# Patient Record
Sex: MALE | Race: OTHER | NOT HISPANIC OR LATINO
[De-identification: names, ages, dates, MRNs, and addresses within clinical notes are randomized per-mention and may not be internally consistent; named-entity substitution may affect disease eponyms.]

---

## 2022-01-01 ENCOUNTER — NON-APPOINTMENT (OUTPATIENT)
Age: 37
End: 2022-01-01

## 2022-01-01 ENCOUNTER — OUTPATIENT (OUTPATIENT)
Dept: OUTPATIENT SERVICES | Facility: HOSPITAL | Age: 37
LOS: 1 days | Discharge: HOME | End: 2022-01-01

## 2022-01-01 ENCOUNTER — APPOINTMENT (OUTPATIENT)
Dept: INFUSION THERAPY | Facility: HOSPITAL | Age: 37
End: 2022-01-01

## 2022-01-01 ENCOUNTER — RESULT REVIEW (OUTPATIENT)
Age: 37
End: 2022-01-01

## 2022-01-01 ENCOUNTER — APPOINTMENT (OUTPATIENT)
Dept: CT IMAGING | Facility: IMAGING CENTER | Age: 37
End: 2022-01-01

## 2022-01-01 ENCOUNTER — APPOINTMENT (OUTPATIENT)
Dept: HEMATOLOGY ONCOLOGY | Facility: CLINIC | Age: 37
End: 2022-01-01

## 2022-01-01 ENCOUNTER — OUTPATIENT (OUTPATIENT)
Dept: OUTPATIENT SERVICES | Facility: HOSPITAL | Age: 37
LOS: 1 days | End: 2022-01-01
Payer: COMMERCIAL

## 2022-01-01 ENCOUNTER — TRANSCRIPTION ENCOUNTER (OUTPATIENT)
Age: 37
End: 2022-01-01

## 2022-01-01 VITALS
RESPIRATION RATE: 16 BRPM | SYSTOLIC BLOOD PRESSURE: 153 MMHG | BODY MASS INDEX: 27.78 KG/M2 | OXYGEN SATURATION: 98 % | HEIGHT: 74.8 IN | TEMPERATURE: 98.6 F | WEIGHT: 221.12 LBS | DIASTOLIC BLOOD PRESSURE: 80 MMHG | HEART RATE: 78 BPM

## 2022-01-01 VITALS
BODY MASS INDEX: 28.09 KG/M2 | HEIGHT: 74.8 IN | RESPIRATION RATE: 16 BRPM | TEMPERATURE: 98.4 F | WEIGHT: 223.55 LBS | HEART RATE: 88 BPM | OXYGEN SATURATION: 99 % | SYSTOLIC BLOOD PRESSURE: 129 MMHG | DIASTOLIC BLOOD PRESSURE: 86 MMHG

## 2022-01-01 DIAGNOSIS — D64.9 ANEMIA, UNSPECIFIED: ICD-10-CM

## 2022-01-01 DIAGNOSIS — C18.9 MALIGNANT NEOPLASM OF COLON, UNSPECIFIED: ICD-10-CM

## 2022-01-01 LAB
ALBUMIN SERPL ELPH-MCNC: 4.5 G/DL
ALBUMIN SERPL ELPH-MCNC: 4.7 G/DL
ALP BLD-CCNC: 92 U/L
ALP BLD-CCNC: 93 U/L
ALT SERPL-CCNC: 15 U/L
ALT SERPL-CCNC: 26 U/L
ANION GAP SERPL CALC-SCNC: 12 MMOL/L
ANION GAP SERPL CALC-SCNC: 14 MMOL/L
AST SERPL-CCNC: 22 U/L
AST SERPL-CCNC: 28 U/L
BASOPHILS # BLD AUTO: 0.09 K/UL — SIGNIFICANT CHANGE UP (ref 0–0.2)
BASOPHILS # BLD AUTO: 0.09 K/UL — SIGNIFICANT CHANGE UP (ref 0–0.2)
BASOPHILS NFR BLD AUTO: 1 % — SIGNIFICANT CHANGE UP (ref 0–2)
BASOPHILS NFR BLD AUTO: 1 % — SIGNIFICANT CHANGE UP (ref 0–2)
BILIRUB SERPL-MCNC: 0.3 MG/DL
BILIRUB SERPL-MCNC: 0.3 MG/DL
BUN SERPL-MCNC: 9 MG/DL
BUN SERPL-MCNC: 9 MG/DL
CALCIUM SERPL-MCNC: 10.4 MG/DL
CALCIUM SERPL-MCNC: 9.4 MG/DL
CEA SERPL-MCNC: 36.6 NG/ML
CHLORIDE SERPL-SCNC: 104 MMOL/L
CHLORIDE SERPL-SCNC: 104 MMOL/L
CO2 SERPL-SCNC: 22 MMOL/L
CO2 SERPL-SCNC: 26 MMOL/L
CREAT SERPL-MCNC: 0.85 MG/DL
CREAT SERPL-MCNC: 0.88 MG/DL
EGFR: 114 ML/MIN/1.73M2
EGFR: 115 ML/MIN/1.73M2
EOSINOPHIL # BLD AUTO: 0.66 K/UL — HIGH (ref 0–0.5)
EOSINOPHIL # BLD AUTO: 0.69 K/UL — HIGH (ref 0–0.5)
EOSINOPHIL NFR BLD AUTO: 7 % — HIGH (ref 0–6)
EOSINOPHIL NFR BLD AUTO: 7.8 % — HIGH (ref 0–6)
GLUCOSE SERPL-MCNC: 82 MG/DL
GLUCOSE SERPL-MCNC: 95 MG/DL
HCT VFR BLD CALC: 42.1 % — SIGNIFICANT CHANGE UP (ref 39–50)
HCT VFR BLD CALC: 44.8 % — SIGNIFICANT CHANGE UP (ref 39–50)
HGB BLD-MCNC: 12.6 G/DL — LOW (ref 13–17)
HGB BLD-MCNC: 13.4 G/DL — SIGNIFICANT CHANGE UP (ref 13–17)
IMM GRANULOCYTES NFR BLD AUTO: 0.1 % — SIGNIFICANT CHANGE UP (ref 0–0.9)
IMM GRANULOCYTES NFR BLD AUTO: 0.2 % — SIGNIFICANT CHANGE UP (ref 0–1.5)
LYMPHOCYTES # BLD AUTO: 2.6 K/UL — SIGNIFICANT CHANGE UP (ref 1–3.3)
LYMPHOCYTES # BLD AUTO: 2.91 K/UL — SIGNIFICANT CHANGE UP (ref 1–3.3)
LYMPHOCYTES # BLD AUTO: 29.2 % — SIGNIFICANT CHANGE UP (ref 13–44)
LYMPHOCYTES # BLD AUTO: 30.7 % — SIGNIFICANT CHANGE UP (ref 13–44)
MCHC RBC-ENTMCNC: 21.4 PG — LOW (ref 27–34)
MCHC RBC-ENTMCNC: 21.7 PG — LOW (ref 27–34)
MCHC RBC-ENTMCNC: 29.9 G/DL — LOW (ref 32–36)
MCHC RBC-ENTMCNC: 29.9 G/DL — LOW (ref 32–36)
MCV RBC AUTO: 71.6 FL — LOW (ref 80–100)
MCV RBC AUTO: 72.6 FL — LOW (ref 80–100)
MONOCYTES # BLD AUTO: 0.62 K/UL — SIGNIFICANT CHANGE UP (ref 0–0.9)
MONOCYTES # BLD AUTO: 0.65 K/UL — SIGNIFICANT CHANGE UP (ref 0–0.9)
MONOCYTES NFR BLD AUTO: 6.5 % — SIGNIFICANT CHANGE UP (ref 2–14)
MONOCYTES NFR BLD AUTO: 7.3 % — SIGNIFICANT CHANGE UP (ref 2–14)
NEUTROPHILS # BLD AUTO: 4.86 K/UL — SIGNIFICANT CHANGE UP (ref 1.8–7.4)
NEUTROPHILS # BLD AUTO: 5.17 K/UL — SIGNIFICANT CHANGE UP (ref 1.8–7.4)
NEUTROPHILS NFR BLD AUTO: 54.6 % — SIGNIFICANT CHANGE UP (ref 43–77)
NEUTROPHILS NFR BLD AUTO: 54.6 % — SIGNIFICANT CHANGE UP (ref 43–77)
NRBC # BLD: 0 /100 WBCS — SIGNIFICANT CHANGE UP (ref 0–0)
NRBC # BLD: 0 /100 WBCS — SIGNIFICANT CHANGE UP (ref 0–0)
PLATELET # BLD AUTO: 297 K/UL — SIGNIFICANT CHANGE UP (ref 150–400)
PLATELET # BLD AUTO: 300 K/UL — SIGNIFICANT CHANGE UP (ref 150–400)
POTASSIUM SERPL-SCNC: 4 MMOL/L
POTASSIUM SERPL-SCNC: 4.8 MMOL/L
PROT SERPL-MCNC: 7.6 G/DL
PROT SERPL-MCNC: 7.9 G/DL
RBC # BLD: 5.88 M/UL — HIGH (ref 4.2–5.8)
RBC # BLD: 6.17 M/UL — HIGH (ref 4.2–5.8)
RBC # FLD: 18.3 % — HIGH (ref 10.3–14.5)
RBC # FLD: 18.6 % — HIGH (ref 10.3–14.5)
SODIUM SERPL-SCNC: 141 MMOL/L
SODIUM SERPL-SCNC: 142 MMOL/L
WBC # BLD: 8.9 K/UL — SIGNIFICANT CHANGE UP (ref 3.8–10.5)
WBC # BLD: 9.47 K/UL — SIGNIFICANT CHANGE UP (ref 3.8–10.5)
WBC # FLD AUTO: 8.9 K/UL — SIGNIFICANT CHANGE UP (ref 3.8–10.5)
WBC # FLD AUTO: 9.47 K/UL — SIGNIFICANT CHANGE UP (ref 3.8–10.5)

## 2022-01-01 PROCEDURE — 74177 CT ABD & PELVIS W/CONTRAST: CPT

## 2022-01-01 PROCEDURE — 71260 CT THORAX DX C+: CPT

## 2022-01-01 PROCEDURE — 99215 OFFICE O/P EST HI 40 MIN: CPT

## 2022-01-01 PROCEDURE — 74177 CT ABD & PELVIS W/CONTRAST: CPT | Mod: 26

## 2022-01-01 PROCEDURE — 71260 CT THORAX DX C+: CPT | Mod: 26

## 2022-01-01 PROCEDURE — 99205 OFFICE O/P NEW HI 60 MIN: CPT

## 2022-01-01 RX ORDER — ONDANSETRON 8 MG/1
8 TABLET ORAL EVERY 8 HOURS
Qty: 30 | Refills: 0 | Status: ACTIVE | COMMUNITY
Start: 2022-01-01 | End: 1900-01-01

## 2022-08-31 PROBLEM — Z00.00 ENCOUNTER FOR PREVENTIVE HEALTH EXAMINATION: Status: ACTIVE | Noted: 2022-01-01

## 2022-09-08 NOTE — HISTORY OF PRESENT ILLNESS
[Disease: _____________________] : Disease: [unfilled] [de-identified] : 37 year old male from [facility], presents to [email or web address] to establish care for metastatic L. sided colon cancer (KRAS WT, BRAF WT, HER2-, SAMAN).\par \par Mr. Barrientos is a 38 y/o M with no PMH who initially presented to hospital in his home country of [facility] in January 2021.  At that time he had experienced left lower quadrant abdominal pain followed by bright red blood per rectum. He underwent a sigmoidoscopy which revealed a 25cm mass from the anal verge obstructing the lumen, path confirmed moderately differentiated adenocarcinoma of colon. He subsequently underwent LAR surgery, with primary end to end anstamosis and resection of 2 omental nodes, 18LN+. He then had 1st line FOLFOX +Bevacizumab for 8 cycles, complicated by catheter site thrombosis now on anticoagulation with Xarelto, and evidence of progression of disease. Then had 2nd line treatment with FOLFIRI + Cetuximab X 4 cycles and f/u CT scan showed interval morphological increase in size of peritoneal nodules with one having increased FDG uptake. He then underwent peritonectomy and HIPEC with residual peritoneal nodule noted to have mucinous adenoca and so treated with 4 cycles of CAPIRI + Cetuximab. Recent CT scan 7/16/2022 showed stable appearing periotoneal nodules with an interval increase of segment 5 hepatic lesion (15 mm from 7mm). Plan was for local treatment of liver lesion with resection vs RFA, however, patient did not have this done and on the recommendation of his oncologists came [place] the [place] for a clinical trial.\par \par PMH/PSH: as above\par Social Hx: has family in [place] (older brother and mother), otherwise wife and 2 young children (son Ravi 3, daughter Jacquie 5) live in [facility], works in  for oil company\par Family Hx: no known history\par Allergies: NKDA\par Medications: taking none at this time\par  [de-identified] : CEAn 7/2022= 20.2 [de-identified] : Mr. Barrientos is feeling well today, states he is completely asymptomatic. Denies fever/chills, nausea/vomiting, abdominal pain, diarrhea/constipation, melena/BRBPR. He is anxious to begin next treatment and misses his family.  [ECOG Performance Status: 0 - Fully active, able to carry on all pre-disease performance without restriction] : Performance Status: 0 - Fully active, able to carry on all pre-disease performance without restriction

## 2022-09-08 NOTE — ASSESSMENT
[FreeTextEntry1] : 37 year old male from [place], presents to [facility] to establish care for metastatic L. sided colon cancer (KRAS WT, BRAF WT, HER2-, SAMAN). s/p 1st line FOLFOX + Linda X 8 cycles, s/p 2nd line FOLOFIRI + Cetuximab X 4 cycles then POD s/p peritonectomy and HIPEC then CAPIRI + Cetuximab X 4 cycles. \par Recent restaging scans 7/2022 with stable appearing peritoneal nodules and increase in size of segment 5 hepatic lesion (15 mm). Recommended by oncologists in [facility] to follow up in [place] for clinical trials.\par \par - STAT CT Chest/Abd/Pelvis to evaluate disease status and compare to prior images\par - will likely need tissue biopsy, will assess after scans\par - will discuss his case at next GI tumor board on 9/14\par \par Return to clinic end of next week\par \par Patient had several questions and all were answered to satisfaction.

## 2022-09-15 NOTE — REASON FOR VISIT
[Follow-Up Visit] : a follow-up [Initial Consultation] : an initial consultation [FreeTextEntry2] : Metastatic Colon Ca

## 2022-09-15 NOTE — HISTORY OF PRESENT ILLNESS
[Disease: _____________________] : Disease: [unfilled] [ECOG Performance Status: 0 - Fully active, able to carry on all pre-disease performance without restriction] : Performance Status: 0 - Fully active, able to carry on all pre-disease performance without restriction [de-identified] : 37 year old male from [facility], presents to [facility] to establish care for metastatic L. sided colon cancer (KRAS WT, BRAF WT, HER2-, SAMAN).\par \par Mr. Barrientos is a 38 y/o M with no PMH who initially presented to hospital in his home country of [facility] in January 2021.  At that time he had experienced left lower quadrant abdominal pain followed by bright red blood per rectum. He underwent a sigmoidoscopy which revealed a 25cm mass from the anal verge obstructing the lumen, path confirmed moderately differentiated adenocarcinoma of colon. He subsequently underwent LAR surgery, with primary end to end anstamosis and resection of 2 omental nodes, 18LN+. He then had 1st line FOLFOX +Bevacizumab for 8 cycles, complicated by catheter site thrombosis now on anticoagulation with Xarelto, and evidence of progression of disease. Then had 2nd line treatment with FOLFIRI + Cetuximab X 4 cycles and f/u PET scan showed interval morphological increase in size of peritoneal nodules with one having increased FDG uptake. He then underwent peritonectomy and HIPEC with residual peritoneal nodule noted to have mucinous adenoca and so treated with 4 cycles of CAPIRI + Cetuximab. Recent CT scan 7/16/2022 showed stable appearing periotoneal nodules with an interval increase of segment 5 hepatic lesion (15 mm from 7mm). 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 Barrientos is feeling well today, states he is completely asymptomatic. Denies fever/chills, nausea/vomiting, abdominal pain, diarrhea/constipation, melena/BRBPR.\par \par His case was discussed at GI Tumor Board on 9/14/2022. Upon radiology review there is increase in size of liver segment from 2X 1.8cm to 3.4X 2.4cm. Additionally a second subcapsular 1cm right lobe lesion conerning for met and another developing one. Mesenteric masses also found including a left upper pelvis nodule 1.8 X1.7cm and a 1.5cm mesenteric nodule.  Given these findings, there are unfortunately no available clinical trials at [facility] that are recruiting. This was conveyed to the patient and although disappointed he is hopeful that next line of therapy will work.

## 2022-09-15 NOTE — ASSESSMENT
[FreeTextEntry1] : 37 year old male from [place], presents to [facility] to establish care for metastatic L. sided colon cancer (KRAS WT, BRAF WT, HER2-, SAMAN). s/p 1st line FOLFOX + Linda X 8 cycles, s/p 2nd line FOLOFIRI + Cetuximab X 4 cycles then POD s/p peritonectomy and HIPEC then CAPIRI + Cetuximab X 4 cycles. \par Recent restaging scans 7/2022 with stable appearing peritoneal nodules and increase in size of segment 5 hepatic lesion (15 mm). Recommended by oncologists in [place] for [place].\par \par \par - patient's case discussed at GI Tumor Board on 9/14/2022 and radiology images reviewed and compared to his prior ones from [place] that were done in July 2022. Upon radiology review there is increase in size of liver segment from 2X 1.8cm to 3.4X 2.4cm. Additionally a second subcapsular 1cm right lobe lesion conerning for met and another developing one. Mesenteric masses also found including a left upper pelvis nodule 1.8 X1.7cm and a 1.5cm mesenteric nodule.  Given these findings, there are unfortunately no available clinical trials at [facility] that are recruiting.  Additionally searched on clinicaltrials.gov and was not able to find any phase III studies that are accruing patients. Patient requested a referral to [facility] which has been placed.\par \par In regards to next line of therapy, patient has progressed on FOLFOX, FOLFIRI with no response to Cetuximab despite KRAS WT status (this was confirmed with his primary oncologist). Have sent liquid biopsy for molecular testing to determine any actionable mutations, but results can take several weeks.  In the meantime discussed subsequent line of treatment with Regorafenib or Lonsurf +/- Bevacizumab per standard of care treatment options via [facility]. Patient elected Regorafenib. Side effects including but not limited to hypertension, fatigue, diarrhea, nausea/vomiting were reviewed and consent signed.  Will begin with Regorafenib 80mg daily X 1 week and if tolerating will increase to full dose 160mg daily. Plan will be for Regorafenib daily X 21 days per every 28 day cycle.\par \par Return to clinic  next week with labs prior.  Have also ordered MRI liver to further elucidate lesions seen on CT scan. \par \par Patient had several questions and all were answered to satisfaction.  Also communicated plan with patient's primary oncologist Dr. Jeronimo and Dr. Daniel in [place].

## 2023-01-01 ENCOUNTER — INPATIENT (INPATIENT)
Facility: HOSPITAL | Age: 38
LOS: 3 days | Discharge: HOME | DRG: 375 | End: 2023-05-26
Attending: STUDENT IN AN ORGANIZED HEALTH CARE EDUCATION/TRAINING PROGRAM | Admitting: HOSPITALIST
Payer: MEDICAID

## 2023-01-01 ENCOUNTER — NON-APPOINTMENT (OUTPATIENT)
Age: 38
End: 2023-01-01

## 2023-01-01 ENCOUNTER — OUTPATIENT (OUTPATIENT)
Dept: OUTPATIENT SERVICES | Facility: HOSPITAL | Age: 38
LOS: 1 days | End: 2023-01-01
Payer: COMMERCIAL

## 2023-01-01 ENCOUNTER — APPOINTMENT (OUTPATIENT)
Dept: CT IMAGING | Facility: CLINIC | Age: 38
End: 2023-01-01

## 2023-01-01 ENCOUNTER — RESULT REVIEW (OUTPATIENT)
Age: 38
End: 2023-01-01

## 2023-01-01 ENCOUNTER — APPOINTMENT (OUTPATIENT)
Dept: RADIOLOGY | Facility: IMAGING CENTER | Age: 38
End: 2023-01-01

## 2023-01-01 ENCOUNTER — APPOINTMENT (OUTPATIENT)
Dept: MRI IMAGING | Facility: IMAGING CENTER | Age: 38
End: 2023-01-01
Payer: MEDICAID

## 2023-01-01 ENCOUNTER — APPOINTMENT (OUTPATIENT)
Dept: HEMATOLOGY ONCOLOGY | Facility: CLINIC | Age: 38
End: 2023-01-01

## 2023-01-01 ENCOUNTER — TRANSCRIPTION ENCOUNTER (OUTPATIENT)
Age: 38
End: 2023-01-01

## 2023-01-01 ENCOUNTER — APPOINTMENT (OUTPATIENT)
Dept: ULTRASOUND IMAGING | Facility: CLINIC | Age: 38
End: 2023-01-01
Payer: MEDICAID

## 2023-01-01 ENCOUNTER — APPOINTMENT (OUTPATIENT)
Dept: GERIATRICS | Facility: CLINIC | Age: 38
End: 2023-01-01
Payer: MEDICAID

## 2023-01-01 ENCOUNTER — OUTPATIENT (OUTPATIENT)
Dept: OUTPATIENT SERVICES | Facility: HOSPITAL | Age: 38
LOS: 1 days | Discharge: HOME | End: 2023-01-01

## 2023-01-01 ENCOUNTER — APPOINTMENT (OUTPATIENT)
Dept: HEMATOLOGY ONCOLOGY | Facility: CLINIC | Age: 38
End: 2023-01-01
Payer: MEDICAID

## 2023-01-01 ENCOUNTER — OUTPATIENT (OUTPATIENT)
Dept: OUTPATIENT SERVICES | Facility: HOSPITAL | Age: 38
LOS: 1 days | End: 2023-01-01
Payer: MEDICAID

## 2023-01-01 ENCOUNTER — EMERGENCY (EMERGENCY)
Facility: HOSPITAL | Age: 38
LOS: 1 days | Discharge: HOME | End: 2023-01-01
Attending: EMERGENCY MEDICINE
Payer: MEDICAID

## 2023-01-01 ENCOUNTER — LABORATORY RESULT (OUTPATIENT)
Age: 38
End: 2023-01-01

## 2023-01-01 ENCOUNTER — APPOINTMENT (OUTPATIENT)
Dept: INFUSION THERAPY | Facility: HOSPITAL | Age: 38
End: 2023-01-01

## 2023-01-01 ENCOUNTER — APPOINTMENT (OUTPATIENT)
Dept: GERIATRICS | Facility: CLINIC | Age: 38
End: 2023-01-01

## 2023-01-01 VITALS
SYSTOLIC BLOOD PRESSURE: 139 MMHG | HEART RATE: 112 BPM | WEIGHT: 202.83 LBS | DIASTOLIC BLOOD PRESSURE: 91 MMHG | HEIGHT: 75 IN | OXYGEN SATURATION: 100 % | TEMPERATURE: 98 F | RESPIRATION RATE: 20 BRPM

## 2023-01-01 VITALS
OXYGEN SATURATION: 97 % | DIASTOLIC BLOOD PRESSURE: 78 MMHG | RESPIRATION RATE: 29 BRPM | HEART RATE: 90 BPM | SYSTOLIC BLOOD PRESSURE: 125 MMHG

## 2023-01-01 VITALS
OXYGEN SATURATION: 99 % | SYSTOLIC BLOOD PRESSURE: 143 MMHG | HEART RATE: 125 BPM | DIASTOLIC BLOOD PRESSURE: 91 MMHG | TEMPERATURE: 97.3 F | RESPIRATION RATE: 16 BRPM

## 2023-01-01 VITALS
WEIGHT: 202.83 LBS | DIASTOLIC BLOOD PRESSURE: 70 MMHG | HEIGHT: 75 IN | RESPIRATION RATE: 18 BRPM | SYSTOLIC BLOOD PRESSURE: 120 MMHG | OXYGEN SATURATION: 98 % | HEART RATE: 130 BPM | TEMPERATURE: 103 F

## 2023-01-01 VITALS
WEIGHT: 203.46 LBS | SYSTOLIC BLOOD PRESSURE: 122 MMHG | HEIGHT: 76.93 IN | BODY MASS INDEX: 24.27 KG/M2 | OXYGEN SATURATION: 98 % | HEART RATE: 148 BPM | RESPIRATION RATE: 17 BRPM | DIASTOLIC BLOOD PRESSURE: 75 MMHG | TEMPERATURE: 101.6 F

## 2023-01-01 VITALS
HEART RATE: 121 BPM | TEMPERATURE: 97.7 F | OXYGEN SATURATION: 99 % | WEIGHT: 209.44 LBS | RESPIRATION RATE: 16 BRPM | DIASTOLIC BLOOD PRESSURE: 78 MMHG | BODY MASS INDEX: 24.88 KG/M2 | SYSTOLIC BLOOD PRESSURE: 117 MMHG

## 2023-01-01 VITALS
RESPIRATION RATE: 16 BRPM | WEIGHT: 208.99 LBS | BODY MASS INDEX: 24.83 KG/M2 | HEART RATE: 120 BPM | TEMPERATURE: 97 F | DIASTOLIC BLOOD PRESSURE: 77 MMHG | SYSTOLIC BLOOD PRESSURE: 127 MMHG | OXYGEN SATURATION: 97 %

## 2023-01-01 VITALS
OXYGEN SATURATION: 98 % | DIASTOLIC BLOOD PRESSURE: 99 MMHG | SYSTOLIC BLOOD PRESSURE: 134 MMHG | TEMPERATURE: 99 F | HEART RATE: 99 BPM | RESPIRATION RATE: 18 BRPM

## 2023-01-01 VITALS
BODY MASS INDEX: 25.09 KG/M2 | OXYGEN SATURATION: 100 % | HEART RATE: 130 BPM | SYSTOLIC BLOOD PRESSURE: 134 MMHG | WEIGHT: 199.72 LBS | TEMPERATURE: 97.8 F | HEIGHT: 74.8 IN | DIASTOLIC BLOOD PRESSURE: 92 MMHG | RESPIRATION RATE: 16 BRPM

## 2023-01-01 DIAGNOSIS — C18.9 MALIGNANT NEOPLASM OF COLON, UNSPECIFIED: ICD-10-CM

## 2023-01-01 DIAGNOSIS — A41.9 SEPSIS, UNSPECIFIED ORGANISM: ICD-10-CM

## 2023-01-01 DIAGNOSIS — D64.9 ANEMIA, UNSPECIFIED: ICD-10-CM

## 2023-01-01 DIAGNOSIS — E87.1 HYPO-OSMOLALITY AND HYPONATREMIA: ICD-10-CM

## 2023-01-01 DIAGNOSIS — D63.0 ANEMIA IN NEOPLASTIC DISEASE: ICD-10-CM

## 2023-01-01 DIAGNOSIS — Z29.9 ENCOUNTER FOR PROPHYLACTIC MEASURES, UNSPECIFIED: ICD-10-CM

## 2023-01-01 DIAGNOSIS — Z51.5 ENCOUNTER FOR PALLIATIVE CARE: ICD-10-CM

## 2023-01-01 DIAGNOSIS — Z90.49 ACQUIRED ABSENCE OF OTHER SPECIFIED PARTS OF DIGESTIVE TRACT: Chronic | ICD-10-CM

## 2023-01-01 DIAGNOSIS — G89.3 NEOPLASM RELATED PAIN (ACUTE) (CHRONIC): ICD-10-CM

## 2023-01-01 DIAGNOSIS — K59.00 CONSTIPATION, UNSPECIFIED: ICD-10-CM

## 2023-01-01 DIAGNOSIS — E86.0 DEHYDRATION: ICD-10-CM

## 2023-01-01 LAB
ALBUMIN SERPL ELPH-MCNC: 2.4 G/DL — LOW (ref 3.3–5)
ALBUMIN SERPL ELPH-MCNC: 2.4 G/DL — LOW (ref 3.3–5)
ALBUMIN SERPL ELPH-MCNC: 2.5 G/DL — LOW (ref 3.3–5)
ALBUMIN SERPL ELPH-MCNC: 2.6 G/DL — LOW (ref 3.3–5)
ALBUMIN SERPL ELPH-MCNC: 2.7 G/DL — LOW (ref 3.3–5)
ALBUMIN SERPL ELPH-MCNC: 2.7 G/DL — LOW (ref 3.3–5)
ALBUMIN SERPL ELPH-MCNC: 2.8 G/DL
ALBUMIN SERPL ELPH-MCNC: 3 G/DL
ALBUMIN SERPL ELPH-MCNC: 3.2 G/DL — LOW (ref 3.3–5)
ALBUMIN SERPL ELPH-MCNC: 3.4 G/DL
ALP BLD-CCNC: 1481 U/L
ALP BLD-CCNC: 1909 U/L
ALP BLD-CCNC: 724 U/L
ALP SERPL-CCNC: 563 U/L — HIGH (ref 40–120)
ALP SERPL-CCNC: 566 U/L — HIGH (ref 40–120)
ALP SERPL-CCNC: 601 U/L — HIGH (ref 40–120)
ALP SERPL-CCNC: 631 U/L — HIGH (ref 40–120)
ALP SERPL-CCNC: 696 U/L — HIGH (ref 40–120)
ALP SERPL-CCNC: 716 U/L — HIGH (ref 40–120)
ALP SERPL-CCNC: 902 U/L — HIGH (ref 40–120)
ALT FLD-CCNC: 30 U/L — SIGNIFICANT CHANGE UP (ref 10–45)
ALT FLD-CCNC: 30 U/L — SIGNIFICANT CHANGE UP (ref 10–45)
ALT FLD-CCNC: 31 U/L — SIGNIFICANT CHANGE UP (ref 10–45)
ALT FLD-CCNC: 36 U/L — SIGNIFICANT CHANGE UP (ref 10–45)
ALT FLD-CCNC: 37 U/L — SIGNIFICANT CHANGE UP (ref 10–45)
ALT SERPL-CCNC: 25 U/L
ALT SERPL-CCNC: 57 U/L
ALT SERPL-CCNC: 66 U/L
ANION GAP SERPL CALC-SCNC: 13 MMOL/L — SIGNIFICANT CHANGE UP (ref 5–17)
ANION GAP SERPL CALC-SCNC: 14 MMOL/L — SIGNIFICANT CHANGE UP (ref 5–17)
ANION GAP SERPL CALC-SCNC: 15 MMOL/L
ANION GAP SERPL CALC-SCNC: 15 MMOL/L — SIGNIFICANT CHANGE UP (ref 5–17)
ANION GAP SERPL CALC-SCNC: 16 MMOL/L
ANION GAP SERPL CALC-SCNC: 17 MMOL/L — SIGNIFICANT CHANGE UP (ref 5–17)
ANION GAP SERPL CALC-SCNC: 17 MMOL/L — SIGNIFICANT CHANGE UP (ref 5–17)
ANION GAP SERPL CALC-SCNC: 18 MMOL/L
ANION GAP SERPL CALC-SCNC: 19 MMOL/L — HIGH (ref 5–17)
ANISOCYTOSIS BLD QL: SIGNIFICANT CHANGE UP
ANISOCYTOSIS BLD QL: SLIGHT — SIGNIFICANT CHANGE UP
ANISOCYTOSIS BLD QL: SLIGHT — SIGNIFICANT CHANGE UP
APPEARANCE UR: ABNORMAL
APPEARANCE UR: ABNORMAL
APPEARANCE UR: CLEAR — SIGNIFICANT CHANGE UP
APTT BLD: 30.6 SEC — SIGNIFICANT CHANGE UP (ref 27.5–35.5)
APTT BLD: 32.6 SEC — SIGNIFICANT CHANGE UP (ref 27.5–35.5)
AST SERPL-CCNC: 107 U/L — HIGH (ref 10–40)
AST SERPL-CCNC: 117 U/L — HIGH (ref 10–40)
AST SERPL-CCNC: 117 U/L — HIGH (ref 10–40)
AST SERPL-CCNC: 126 U/L — HIGH (ref 10–40)
AST SERPL-CCNC: 126 U/L — HIGH (ref 10–40)
AST SERPL-CCNC: 131 U/L — HIGH (ref 10–40)
AST SERPL-CCNC: 141 U/L — HIGH (ref 10–40)
AST SERPL-CCNC: 227 U/L
AST SERPL-CCNC: 316 U/L
AST SERPL-CCNC: 79 U/L
BACTERIA # UR AUTO: NEGATIVE — SIGNIFICANT CHANGE UP
BASE EXCESS BLDV CALC-SCNC: -2.8 MMOL/L — LOW (ref -2–3)
BASE EXCESS BLDV CALC-SCNC: -3.6 MMOL/L — LOW (ref -2–3)
BASE EXCESS BLDV CALC-SCNC: -3.8 MMOL/L — LOW (ref -2–3)
BASE EXCESS BLDV CALC-SCNC: -5.7 MMOL/L — LOW (ref -2–3)
BASOPHILS # BLD AUTO: 0 K/UL — SIGNIFICANT CHANGE UP (ref 0–0.2)
BASOPHILS # BLD AUTO: 0.03 K/UL — SIGNIFICANT CHANGE UP (ref 0–0.2)
BASOPHILS # BLD AUTO: 0.05 K/UL — SIGNIFICANT CHANGE UP (ref 0–0.2)
BASOPHILS NFR BLD AUTO: 0 % — SIGNIFICANT CHANGE UP (ref 0–2)
BASOPHILS NFR BLD AUTO: 0.2 % — SIGNIFICANT CHANGE UP (ref 0–2)
BASOPHILS NFR BLD AUTO: 0.5 % — SIGNIFICANT CHANGE UP (ref 0–2)
BILIRUB DIRECT SERPL-MCNC: 4.1 MG/DL — HIGH (ref 0–0.3)
BILIRUB INDIRECT FLD-MCNC: 1.1 MG/DL — HIGH (ref 0.2–1)
BILIRUB SERPL-MCNC: 0.6 MG/DL
BILIRUB SERPL-MCNC: 1.2 MG/DL — SIGNIFICANT CHANGE UP (ref 0.2–1.2)
BILIRUB SERPL-MCNC: 2.5 MG/DL — HIGH (ref 0.2–1.2)
BILIRUB SERPL-MCNC: 2.6 MG/DL — HIGH (ref 0.2–1.2)
BILIRUB SERPL-MCNC: 4.3 MG/DL — HIGH (ref 0.2–1.2)
BILIRUB SERPL-MCNC: 4.8 MG/DL — HIGH (ref 0.2–1.2)
BILIRUB SERPL-MCNC: 4.9 MG/DL — HIGH (ref 0.2–1.2)
BILIRUB SERPL-MCNC: 5.2 MG/DL — HIGH (ref 0.2–1.2)
BILIRUB SERPL-MCNC: 6 MG/DL
BILIRUB SERPL-MCNC: 7.6 MG/DL
BILIRUB UR-MCNC: ABNORMAL
BILIRUB UR-MCNC: ABNORMAL
BILIRUB UR-MCNC: NEGATIVE — SIGNIFICANT CHANGE UP
BLD GP AB SCN SERPL QL: NEGATIVE — SIGNIFICANT CHANGE UP
BLD GP AB SCN SERPL QL: NEGATIVE — SIGNIFICANT CHANGE UP
BUN SERPL-MCNC: 10 MG/DL — SIGNIFICANT CHANGE UP (ref 7–23)
BUN SERPL-MCNC: 10 MG/DL — SIGNIFICANT CHANGE UP (ref 7–23)
BUN SERPL-MCNC: 12 MG/DL — SIGNIFICANT CHANGE UP (ref 7–23)
BUN SERPL-MCNC: 13 MG/DL
BUN SERPL-MCNC: 13 MG/DL — SIGNIFICANT CHANGE UP (ref 7–23)
BUN SERPL-MCNC: 14 MG/DL
BUN SERPL-MCNC: 14 MG/DL — SIGNIFICANT CHANGE UP (ref 7–23)
BUN SERPL-MCNC: 14 MG/DL — SIGNIFICANT CHANGE UP (ref 7–23)
BUN SERPL-MCNC: 15 MG/DL — SIGNIFICANT CHANGE UP (ref 7–23)
BUN SERPL-MCNC: 15 MG/DL — SIGNIFICANT CHANGE UP (ref 7–23)
BUN SERPL-MCNC: 16 MG/DL — SIGNIFICANT CHANGE UP (ref 7–23)
BUN SERPL-MCNC: 21 MG/DL
BURR CELLS BLD QL SMEAR: PRESENT — SIGNIFICANT CHANGE UP
CA-I SERPL-SCNC: 1.14 MMOL/L — LOW (ref 1.15–1.33)
CA-I SERPL-SCNC: 1.17 MMOL/L — SIGNIFICANT CHANGE UP (ref 1.15–1.33)
CA-I SERPL-SCNC: 1.17 MMOL/L — SIGNIFICANT CHANGE UP (ref 1.15–1.33)
CA-I SERPL-SCNC: 1.18 MMOL/L — SIGNIFICANT CHANGE UP (ref 1.15–1.33)
CALCIUM SERPL-MCNC: 8 MG/DL — LOW (ref 8.4–10.5)
CALCIUM SERPL-MCNC: 8.3 MG/DL — LOW (ref 8.4–10.5)
CALCIUM SERPL-MCNC: 8.4 MG/DL — SIGNIFICANT CHANGE UP (ref 8.4–10.5)
CALCIUM SERPL-MCNC: 8.9 MG/DL — SIGNIFICANT CHANGE UP (ref 8.4–10.5)
CALCIUM SERPL-MCNC: 9 MG/DL — SIGNIFICANT CHANGE UP (ref 8.4–10.5)
CALCIUM SERPL-MCNC: 9.1 MG/DL — SIGNIFICANT CHANGE UP (ref 8.4–10.5)
CALCIUM SERPL-MCNC: 9.1 MG/DL — SIGNIFICANT CHANGE UP (ref 8.4–10.5)
CALCIUM SERPL-MCNC: 9.2 MG/DL — SIGNIFICANT CHANGE UP (ref 8.4–10.5)
CALCIUM SERPL-MCNC: 9.3 MG/DL — SIGNIFICANT CHANGE UP (ref 8.4–10.5)
CALCIUM SERPL-MCNC: 9.5 MG/DL
CALCIUM SERPL-MCNC: 9.7 MG/DL
CALCIUM SERPL-MCNC: 9.9 MG/DL
CEA SERPL-MCNC: 922 NG/ML
CEA SERPL-MCNC: 949 NG/ML
CHLORIDE BLDV-SCNC: 100 MMOL/L — SIGNIFICANT CHANGE UP (ref 96–108)
CHLORIDE BLDV-SCNC: 101 MMOL/L — SIGNIFICANT CHANGE UP (ref 96–108)
CHLORIDE BLDV-SCNC: 102 MMOL/L — SIGNIFICANT CHANGE UP (ref 96–108)
CHLORIDE BLDV-SCNC: 96 MMOL/L — SIGNIFICANT CHANGE UP (ref 96–108)
CHLORIDE SERPL-SCNC: 100 MMOL/L
CHLORIDE SERPL-SCNC: 100 MMOL/L — SIGNIFICANT CHANGE UP (ref 96–108)
CHLORIDE SERPL-SCNC: 90 MMOL/L
CHLORIDE SERPL-SCNC: 93 MMOL/L — LOW (ref 96–108)
CHLORIDE SERPL-SCNC: 96 MMOL/L — SIGNIFICANT CHANGE UP (ref 96–108)
CHLORIDE SERPL-SCNC: 97 MMOL/L
CHLORIDE SERPL-SCNC: 97 MMOL/L — SIGNIFICANT CHANGE UP (ref 96–108)
CHLORIDE SERPL-SCNC: 98 MMOL/L — SIGNIFICANT CHANGE UP (ref 96–108)
CHLORIDE SERPL-SCNC: 98 MMOL/L — SIGNIFICANT CHANGE UP (ref 96–108)
CHLORIDE SERPL-SCNC: 99 MMOL/L — SIGNIFICANT CHANGE UP (ref 96–108)
CO2 BLDV-SCNC: 21 MMOL/L — LOW (ref 22–26)
CO2 BLDV-SCNC: 23 MMOL/L — SIGNIFICANT CHANGE UP (ref 22–26)
CO2 BLDV-SCNC: 23 MMOL/L — SIGNIFICANT CHANGE UP (ref 22–26)
CO2 BLDV-SCNC: 24 MMOL/L — SIGNIFICANT CHANGE UP (ref 22–26)
CO2 SERPL-SCNC: 17 MMOL/L — LOW (ref 22–31)
CO2 SERPL-SCNC: 18 MMOL/L
CO2 SERPL-SCNC: 18 MMOL/L — LOW (ref 22–31)
CO2 SERPL-SCNC: 19 MMOL/L — LOW (ref 22–31)
CO2 SERPL-SCNC: 20 MMOL/L
CO2 SERPL-SCNC: 20 MMOL/L — LOW (ref 22–31)
CO2 SERPL-SCNC: 20 MMOL/L — LOW (ref 22–31)
CO2 SERPL-SCNC: 21 MMOL/L
COLOR SPEC: ABNORMAL
COLOR SPEC: ABNORMAL
COLOR SPEC: YELLOW — SIGNIFICANT CHANGE UP
CREAT SERPL-MCNC: 0.63 MG/DL — SIGNIFICANT CHANGE UP (ref 0.5–1.3)
CREAT SERPL-MCNC: 0.64 MG/DL — SIGNIFICANT CHANGE UP (ref 0.5–1.3)
CREAT SERPL-MCNC: 0.64 MG/DL — SIGNIFICANT CHANGE UP (ref 0.5–1.3)
CREAT SERPL-MCNC: 0.65 MG/DL — SIGNIFICANT CHANGE UP (ref 0.5–1.3)
CREAT SERPL-MCNC: 0.71 MG/DL
CREAT SERPL-MCNC: 0.72 MG/DL — SIGNIFICANT CHANGE UP (ref 0.5–1.3)
CREAT SERPL-MCNC: 0.87 MG/DL — SIGNIFICANT CHANGE UP (ref 0.5–1.3)
CREAT SERPL-MCNC: 0.88 MG/DL
CREAT SERPL-MCNC: 0.94 MG/DL
CREAT SERPL-MCNC: 0.95 MG/DL — SIGNIFICANT CHANGE UP (ref 0.5–1.3)
CULTURE RESULTS: NO GROWTH — SIGNIFICANT CHANGE UP
CULTURE RESULTS: SIGNIFICANT CHANGE UP
DACRYOCYTES BLD QL SMEAR: SLIGHT — SIGNIFICANT CHANGE UP
DIFF PNL FLD: ABNORMAL
EGFR: 106 ML/MIN/1.73M2 — SIGNIFICANT CHANGE UP
EGFR: 107 ML/MIN/1.73M2
EGFR: 114 ML/MIN/1.73M2
EGFR: 114 ML/MIN/1.73M2 — SIGNIFICANT CHANGE UP
EGFR: 121 ML/MIN/1.73M2
EGFR: 121 ML/MIN/1.73M2 — SIGNIFICANT CHANGE UP
EGFR: 124 ML/MIN/1.73M2 — SIGNIFICANT CHANGE UP
EGFR: 125 ML/MIN/1.73M2 — SIGNIFICANT CHANGE UP
EGFR: 125 ML/MIN/1.73M2 — SIGNIFICANT CHANGE UP
EGFR: 126 ML/MIN/1.73M2 — SIGNIFICANT CHANGE UP
EOSINOPHIL # BLD AUTO: 0 K/UL — SIGNIFICANT CHANGE UP (ref 0–0.5)
EOSINOPHIL # BLD AUTO: 0.05 K/UL — SIGNIFICANT CHANGE UP (ref 0–0.5)
EOSINOPHIL # BLD AUTO: 0.13 K/UL — SIGNIFICANT CHANGE UP (ref 0–0.5)
EOSINOPHIL # BLD AUTO: 0.18 K/UL — SIGNIFICANT CHANGE UP (ref 0–0.5)
EOSINOPHIL NFR BLD AUTO: 0 % — SIGNIFICANT CHANGE UP (ref 0–6)
EOSINOPHIL NFR BLD AUTO: 0.3 % — SIGNIFICANT CHANGE UP (ref 0–6)
EOSINOPHIL NFR BLD AUTO: 0.9 % — SIGNIFICANT CHANGE UP (ref 0–6)
EOSINOPHIL NFR BLD AUTO: 1.6 % — SIGNIFICANT CHANGE UP (ref 0–6)
EPI CELLS # UR: 0 /HPF — SIGNIFICANT CHANGE UP
EPI CELLS # UR: 0 /HPF — SIGNIFICANT CHANGE UP
EPI CELLS # UR: 1 /HPF — SIGNIFICANT CHANGE UP
FERRITIN SERPL-MCNC: 750 NG/ML — HIGH (ref 30–400)
GAS PNL BLDV: 129 MMOL/L — LOW (ref 136–145)
GAS PNL BLDV: 130 MMOL/L — LOW (ref 136–145)
GAS PNL BLDV: 131 MMOL/L — LOW (ref 136–145)
GAS PNL BLDV: 134 MMOL/L — LOW (ref 136–145)
GAS PNL BLDV: SIGNIFICANT CHANGE UP
GIANT PLATELETS BLD QL SMEAR: PRESENT — SIGNIFICANT CHANGE UP
GIANT PLATELETS BLD QL SMEAR: PRESENT — SIGNIFICANT CHANGE UP
GLUCOSE BLDC GLUCOMTR-MCNC: 124 MG/DL — HIGH (ref 70–99)
GLUCOSE BLDC GLUCOMTR-MCNC: 83 MG/DL — SIGNIFICANT CHANGE UP (ref 70–99)
GLUCOSE BLDC GLUCOMTR-MCNC: 86 MG/DL — SIGNIFICANT CHANGE UP (ref 70–99)
GLUCOSE BLDC GLUCOMTR-MCNC: 94 MG/DL — SIGNIFICANT CHANGE UP (ref 70–99)
GLUCOSE BLDC GLUCOMTR-MCNC: 96 MG/DL — SIGNIFICANT CHANGE UP (ref 70–99)
GLUCOSE BLDV-MCNC: 102 MG/DL — HIGH (ref 70–99)
GLUCOSE BLDV-MCNC: 122 MG/DL — HIGH (ref 70–99)
GLUCOSE BLDV-MCNC: 76 MG/DL — SIGNIFICANT CHANGE UP (ref 70–99)
GLUCOSE BLDV-MCNC: 79 MG/DL — SIGNIFICANT CHANGE UP (ref 70–99)
GLUCOSE SERPL-MCNC: 101 MG/DL — HIGH (ref 70–99)
GLUCOSE SERPL-MCNC: 106 MG/DL — HIGH (ref 70–99)
GLUCOSE SERPL-MCNC: 116 MG/DL — HIGH (ref 70–99)
GLUCOSE SERPL-MCNC: 70 MG/DL — SIGNIFICANT CHANGE UP (ref 70–99)
GLUCOSE SERPL-MCNC: 74 MG/DL — SIGNIFICANT CHANGE UP (ref 70–99)
GLUCOSE SERPL-MCNC: 75 MG/DL — SIGNIFICANT CHANGE UP (ref 70–99)
GLUCOSE SERPL-MCNC: 76 MG/DL — SIGNIFICANT CHANGE UP (ref 70–99)
GLUCOSE SERPL-MCNC: 85 MG/DL — SIGNIFICANT CHANGE UP (ref 70–99)
GLUCOSE SERPL-MCNC: 91 MG/DL
GLUCOSE SERPL-MCNC: 92 MG/DL
GLUCOSE SERPL-MCNC: 94 MG/DL — SIGNIFICANT CHANGE UP (ref 70–99)
GLUCOSE SERPL-MCNC: 95 MG/DL
GLUCOSE UR QL: NEGATIVE — SIGNIFICANT CHANGE UP
GRAN CASTS # UR COMP ASSIST: 1 /LPF — SIGNIFICANT CHANGE UP
HAPTOGLOB SERPL-MCNC: 442 MG/DL — HIGH (ref 34–200)
HCO3 BLDV-SCNC: 20 MMOL/L — LOW (ref 22–29)
HCO3 BLDV-SCNC: 22 MMOL/L — SIGNIFICANT CHANGE UP (ref 22–29)
HCT VFR BLD CALC: 17.6 % — CRITICAL LOW (ref 39–50)
HCT VFR BLD CALC: 21.1 % — LOW (ref 39–50)
HCT VFR BLD CALC: 23.6 % — LOW (ref 39–50)
HCT VFR BLD CALC: 27 % — LOW (ref 39–50)
HCT VFR BLD CALC: 27.7 % — LOW (ref 39–50)
HCT VFR BLD CALC: 28.4 % — LOW (ref 39–50)
HCT VFR BLD CALC: 28.6 % — LOW (ref 39–50)
HCT VFR BLD CALC: 28.8 % — LOW (ref 39–50)
HCT VFR BLD CALC: 29.3 % — LOW (ref 39–50)
HCT VFR BLD CALC: 29.4 % — LOW (ref 39–50)
HCT VFR BLD CALC: 30.4 % — LOW (ref 39–50)
HCT VFR BLDA CALC: 22 % — LOW (ref 39–51)
HCT VFR BLDA CALC: 23 % — LOW (ref 39–51)
HCT VFR BLDA CALC: 25 % — LOW (ref 39–51)
HCT VFR BLDA CALC: 28 % — LOW (ref 39–51)
HGB BLD CALC-MCNC: 7.3 G/DL — LOW (ref 12.6–17.4)
HGB BLD CALC-MCNC: 7.6 G/DL — LOW (ref 12.6–17.4)
HGB BLD CALC-MCNC: 8.2 G/DL — LOW (ref 12.6–17.4)
HGB BLD CALC-MCNC: 9.3 G/DL — LOW (ref 12.6–17.4)
HGB BLD-MCNC: 5.5 G/DL — CRITICAL LOW (ref 13–17)
HGB BLD-MCNC: 6.6 G/DL — CRITICAL LOW (ref 13–17)
HGB BLD-MCNC: 7.1 G/DL — LOW (ref 13–17)
HGB BLD-MCNC: 8.2 G/DL — LOW (ref 13–17)
HGB BLD-MCNC: 8.5 G/DL — LOW (ref 13–17)
HGB BLD-MCNC: 8.6 G/DL — LOW (ref 13–17)
HGB BLD-MCNC: 8.8 G/DL — LOW (ref 13–17)
HGB BLD-MCNC: 8.8 G/DL — LOW (ref 13–17)
HGB BLD-MCNC: 9.3 G/DL — LOW (ref 13–17)
HYALINE CASTS # UR AUTO: 0 /LPF — SIGNIFICANT CHANGE UP (ref 0–2)
HYALINE CASTS # UR AUTO: 4 /LPF — HIGH (ref 0–2)
HYALINE CASTS # UR AUTO: 6 /LPF — HIGH (ref 0–2)
HYPOCHROMIA BLD QL: SLIGHT — SIGNIFICANT CHANGE UP
IMM GRANULOCYTES NFR BLD AUTO: 0.5 % — SIGNIFICANT CHANGE UP (ref 0–0.9)
IMM GRANULOCYTES NFR BLD AUTO: 3.8 % — HIGH (ref 0–0.9)
INR BLD: 1.99 RATIO — HIGH (ref 0.88–1.16)
INR BLD: 2.01 RATIO — HIGH (ref 0.88–1.16)
INR BLD: 2.02 RATIO — HIGH (ref 0.88–1.16)
INR PPP: 1.68 RATIO
IRON SATN MFR SERPL: 46 % — SIGNIFICANT CHANGE UP (ref 16–55)
IRON SATN MFR SERPL: 74 UG/DL — SIGNIFICANT CHANGE UP (ref 45–165)
KETONES UR-MCNC: NEGATIVE — SIGNIFICANT CHANGE UP
LACTATE BLDV-MCNC: 2.7 MMOL/L — HIGH (ref 0.5–2)
LACTATE BLDV-MCNC: 2.9 MMOL/L — HIGH (ref 0.5–2)
LACTATE BLDV-MCNC: 3.9 MMOL/L — HIGH (ref 0.5–2)
LACTATE BLDV-MCNC: 4.5 MMOL/L — CRITICAL HIGH (ref 0.5–2)
LDH SERPL L TO P-CCNC: 826 U/L — HIGH (ref 50–242)
LEUKOCYTE ESTERASE UR-ACNC: NEGATIVE — SIGNIFICANT CHANGE UP
LIDOCAIN IGE QN: 56 U/L — SIGNIFICANT CHANGE UP (ref 7–60)
LYMPHOCYTES # BLD AUTO: 0.52 K/UL — LOW (ref 1–3.3)
LYMPHOCYTES # BLD AUTO: 0.94 K/UL — LOW (ref 1–3.3)
LYMPHOCYTES # BLD AUTO: 0.97 K/UL — LOW (ref 1–3.3)
LYMPHOCYTES # BLD AUTO: 1.03 K/UL — SIGNIFICANT CHANGE UP (ref 1–3.3)
LYMPHOCYTES # BLD AUTO: 1.1 K/UL — SIGNIFICANT CHANGE UP (ref 1–3.3)
LYMPHOCYTES # BLD AUTO: 1.25 K/UL — SIGNIFICANT CHANGE UP (ref 1–3.3)
LYMPHOCYTES # BLD AUTO: 10 % — LOW (ref 13–44)
LYMPHOCYTES # BLD AUTO: 4 % — LOW (ref 13–44)
LYMPHOCYTES # BLD AUTO: 6.2 % — LOW (ref 13–44)
LYMPHOCYTES # BLD AUTO: 6.6 % — LOW (ref 13–44)
LYMPHOCYTES # BLD AUTO: 7 % — LOW (ref 13–44)
LYMPHOCYTES # BLD AUTO: 8.1 % — LOW (ref 13–44)
MANUAL SMEAR VERIFICATION: SIGNIFICANT CHANGE UP
MCHC RBC-ENTMCNC: 20.1 PG — LOW (ref 27–34)
MCHC RBC-ENTMCNC: 20.5 PG — LOW (ref 27–34)
MCHC RBC-ENTMCNC: 20.6 PG — LOW (ref 27–34)
MCHC RBC-ENTMCNC: 20.8 PG — LOW (ref 27–34)
MCHC RBC-ENTMCNC: 20.8 PG — LOW (ref 27–34)
MCHC RBC-ENTMCNC: 21.2 PG — LOW (ref 27–34)
MCHC RBC-ENTMCNC: 21.5 PG — LOW (ref 27–34)
MCHC RBC-ENTMCNC: 21.7 PG — LOW (ref 27–34)
MCHC RBC-ENTMCNC: 22 PG — LOW (ref 27–34)
MCHC RBC-ENTMCNC: 22.2 PG — LOW (ref 27–34)
MCHC RBC-ENTMCNC: 23.6 PG — LOW (ref 27–34)
MCHC RBC-ENTMCNC: 29.5 G/DL — LOW (ref 32–36)
MCHC RBC-ENTMCNC: 29.9 G/DL — LOW (ref 32–36)
MCHC RBC-ENTMCNC: 30 GM/DL — LOW (ref 32–36)
MCHC RBC-ENTMCNC: 30.1 GM/DL — LOW (ref 32–36)
MCHC RBC-ENTMCNC: 30.1 GM/DL — LOW (ref 32–36)
MCHC RBC-ENTMCNC: 30.3 GM/DL — LOW (ref 32–36)
MCHC RBC-ENTMCNC: 30.4 G/DL — LOW (ref 32–36)
MCHC RBC-ENTMCNC: 30.6 GM/DL — LOW (ref 32–36)
MCHC RBC-ENTMCNC: 31 GM/DL — LOW (ref 32–36)
MCHC RBC-ENTMCNC: 31.3 G/DL — LOW (ref 32–36)
MCHC RBC-ENTMCNC: 31.3 GM/DL — LOW (ref 32–36)
MCV RBC AUTO: 65.9 FL — LOW (ref 80–100)
MCV RBC AUTO: 66.4 FL — LOW (ref 80–100)
MCV RBC AUTO: 66.7 FL — LOW (ref 80–100)
MCV RBC AUTO: 68.4 FL — LOW (ref 80–100)
MCV RBC AUTO: 69.3 FL — LOW (ref 80–100)
MCV RBC AUTO: 69.4 FL — LOW (ref 80–100)
MCV RBC AUTO: 70.4 FL — LOW (ref 80–100)
MCV RBC AUTO: 71.7 FL — LOW (ref 80–100)
MCV RBC AUTO: 73.1 FL — LOW (ref 80–100)
MCV RBC AUTO: 74.2 FL — LOW (ref 80–100)
MCV RBC AUTO: 77.8 FL — LOW (ref 80–100)
METAMYELOCYTES # FLD: 1 % — HIGH (ref 0–0)
METAMYELOCYTES # FLD: 1.7 % — HIGH (ref 0–0)
MICROCYTES BLD QL: SIGNIFICANT CHANGE UP
MICROCYTES BLD QL: SLIGHT — SIGNIFICANT CHANGE UP
MICROCYTES BLD QL: SLIGHT — SIGNIFICANT CHANGE UP
MISCELLANEOUS TEST: NORMAL
MONOCYTES # BLD AUTO: 0.76 K/UL — SIGNIFICANT CHANGE UP (ref 0–0.9)
MONOCYTES # BLD AUTO: 0.91 K/UL — HIGH (ref 0–0.9)
MONOCYTES # BLD AUTO: 1.12 K/UL — HIGH (ref 0–0.9)
MONOCYTES # BLD AUTO: 1.22 K/UL — HIGH (ref 0–0.9)
MONOCYTES # BLD AUTO: 1.53 K/UL — HIGH (ref 0–0.9)
MONOCYTES # BLD AUTO: 1.64 K/UL — HIGH (ref 0–0.9)
MONOCYTES NFR BLD AUTO: 10.2 % — SIGNIFICANT CHANGE UP (ref 2–14)
MONOCYTES NFR BLD AUTO: 10.4 % — SIGNIFICANT CHANGE UP (ref 2–14)
MONOCYTES NFR BLD AUTO: 10.6 % — SIGNIFICANT CHANGE UP (ref 2–14)
MONOCYTES NFR BLD AUTO: 5.2 % — SIGNIFICANT CHANGE UP (ref 2–14)
MONOCYTES NFR BLD AUTO: 7 % — SIGNIFICANT CHANGE UP (ref 2–14)
MONOCYTES NFR BLD AUTO: 8 % — SIGNIFICANT CHANGE UP (ref 2–14)
MYELOCYTES NFR BLD: 2.4 % — HIGH (ref 0–0)
MYELOCYTES NFR BLD: 2.6 % — HIGH (ref 0–0)
NEUTROPHILS # BLD AUTO: 11.15 K/UL — HIGH (ref 1.8–7.4)
NEUTROPHILS # BLD AUTO: 11.39 K/UL — HIGH (ref 1.8–7.4)
NEUTROPHILS # BLD AUTO: 12.19 K/UL — HIGH (ref 1.8–7.4)
NEUTROPHILS # BLD AUTO: 12.24 K/UL — HIGH (ref 1.8–7.4)
NEUTROPHILS # BLD AUTO: 13.08 K/UL — HIGH (ref 1.8–7.4)
NEUTROPHILS # BLD AUTO: 8.49 K/UL — HIGH (ref 1.8–7.4)
NEUTROPHILS NFR BLD AUTO: 73.9 % — SIGNIFICANT CHANGE UP (ref 43–77)
NEUTROPHILS NFR BLD AUTO: 74 % — SIGNIFICANT CHANGE UP (ref 43–77)
NEUTROPHILS NFR BLD AUTO: 77.2 % — HIGH (ref 43–77)
NEUTROPHILS NFR BLD AUTO: 83.9 % — HIGH (ref 43–77)
NEUTROPHILS NFR BLD AUTO: 85.8 % — HIGH (ref 43–77)
NEUTROPHILS NFR BLD AUTO: 88 % — HIGH (ref 43–77)
NEUTS BAND # BLD: 1.8 % — SIGNIFICANT CHANGE UP (ref 0–8)
NEUTS BAND # BLD: 4.9 % — SIGNIFICANT CHANGE UP (ref 0–8)
NITRITE UR-MCNC: NEGATIVE — SIGNIFICANT CHANGE UP
NRBC # BLD: 0 /100 WBCS — SIGNIFICANT CHANGE UP (ref 0–0)
NRBC # BLD: 1 /100 — HIGH (ref 0–0)
NRBC # BLD: SIGNIFICANT CHANGE UP /100 WBCS (ref 0–0)
OB PNL STL: NEGATIVE — SIGNIFICANT CHANGE UP
OSMOLALITY SERPL: 282 MOSMOL/KG — SIGNIFICANT CHANGE UP (ref 275–300)
OSMOLALITY UR: 715 MOS/KG — SIGNIFICANT CHANGE UP (ref 300–900)
OVALOCYTES BLD QL SMEAR: SLIGHT — SIGNIFICANT CHANGE UP
PCO2 BLDV: 36 MMHG — LOW (ref 42–55)
PCO2 BLDV: 40 MMHG — LOW (ref 42–55)
PCO2 BLDV: 42 MMHG — SIGNIFICANT CHANGE UP (ref 42–55)
PCO2 BLDV: 44 MMHG — SIGNIFICANT CHANGE UP (ref 42–55)
PH BLDV: 7.31 — LOW (ref 7.32–7.43)
PH BLDV: 7.31 — LOW (ref 7.32–7.43)
PH BLDV: 7.33 — SIGNIFICANT CHANGE UP (ref 7.32–7.43)
PH BLDV: 7.39 — SIGNIFICANT CHANGE UP (ref 7.32–7.43)
PH UR: 6 — SIGNIFICANT CHANGE UP (ref 5–8)
PLAT MORPH BLD: ABNORMAL
PLAT MORPH BLD: NORMAL — SIGNIFICANT CHANGE UP
PLATELET # BLD AUTO: 250 K/UL — SIGNIFICANT CHANGE UP (ref 150–400)
PLATELET # BLD AUTO: 321 K/UL — SIGNIFICANT CHANGE UP (ref 150–400)
PLATELET # BLD AUTO: 338 K/UL — SIGNIFICANT CHANGE UP (ref 150–400)
PLATELET # BLD AUTO: 364 K/UL — SIGNIFICANT CHANGE UP (ref 150–400)
PLATELET # BLD AUTO: 395 K/UL — SIGNIFICANT CHANGE UP (ref 150–400)
PLATELET # BLD AUTO: 430 K/UL — HIGH (ref 150–400)
PLATELET # BLD AUTO: 454 K/UL — HIGH (ref 150–400)
PLATELET # BLD AUTO: 455 K/UL — HIGH (ref 150–400)
PLATELET # BLD AUTO: 493 K/UL — HIGH (ref 150–400)
PLATELET # BLD AUTO: 504 K/UL — HIGH (ref 150–400)
PLATELET # BLD AUTO: SIGNIFICANT CHANGE UP K/UL (ref 150–400)
PO2 BLDV: 24 MMHG — LOW (ref 25–45)
PO2 BLDV: 30 MMHG — SIGNIFICANT CHANGE UP (ref 25–45)
PO2 BLDV: 31 MMHG — SIGNIFICANT CHANGE UP (ref 25–45)
PO2 BLDV: 37 MMHG — SIGNIFICANT CHANGE UP (ref 25–45)
POIKILOCYTOSIS BLD QL AUTO: SIGNIFICANT CHANGE UP
POIKILOCYTOSIS BLD QL AUTO: SLIGHT — SIGNIFICANT CHANGE UP
POIKILOCYTOSIS BLD QL AUTO: SLIGHT — SIGNIFICANT CHANGE UP
POLYCHROMASIA BLD QL SMEAR: SLIGHT — SIGNIFICANT CHANGE UP
POTASSIUM BLDV-SCNC: 4.1 MMOL/L — SIGNIFICANT CHANGE UP (ref 3.5–5.1)
POTASSIUM BLDV-SCNC: 4.2 MMOL/L — SIGNIFICANT CHANGE UP (ref 3.5–5.1)
POTASSIUM BLDV-SCNC: 4.4 MMOL/L — SIGNIFICANT CHANGE UP (ref 3.5–5.1)
POTASSIUM BLDV-SCNC: 4.6 MMOL/L — SIGNIFICANT CHANGE UP (ref 3.5–5.1)
POTASSIUM SERPL-MCNC: 3.9 MMOL/L — SIGNIFICANT CHANGE UP (ref 3.5–5.3)
POTASSIUM SERPL-MCNC: 4.1 MMOL/L — SIGNIFICANT CHANGE UP (ref 3.5–5.3)
POTASSIUM SERPL-MCNC: 4.4 MMOL/L — SIGNIFICANT CHANGE UP (ref 3.5–5.3)
POTASSIUM SERPL-MCNC: 4.6 MMOL/L — SIGNIFICANT CHANGE UP (ref 3.5–5.3)
POTASSIUM SERPL-MCNC: 4.9 MMOL/L — SIGNIFICANT CHANGE UP (ref 3.5–5.3)
POTASSIUM SERPL-MCNC: 5 MMOL/L — SIGNIFICANT CHANGE UP (ref 3.5–5.3)
POTASSIUM SERPL-MCNC: 5.7 MMOL/L — HIGH (ref 3.5–5.3)
POTASSIUM SERPL-SCNC: 3.9 MMOL/L — SIGNIFICANT CHANGE UP (ref 3.5–5.3)
POTASSIUM SERPL-SCNC: 4.1 MMOL/L — SIGNIFICANT CHANGE UP (ref 3.5–5.3)
POTASSIUM SERPL-SCNC: 4.4 MMOL/L — SIGNIFICANT CHANGE UP (ref 3.5–5.3)
POTASSIUM SERPL-SCNC: 4.6 MMOL/L — SIGNIFICANT CHANGE UP (ref 3.5–5.3)
POTASSIUM SERPL-SCNC: 4.7 MMOL/L
POTASSIUM SERPL-SCNC: 4.9 MMOL/L
POTASSIUM SERPL-SCNC: 4.9 MMOL/L — SIGNIFICANT CHANGE UP (ref 3.5–5.3)
POTASSIUM SERPL-SCNC: 5 MMOL/L — SIGNIFICANT CHANGE UP (ref 3.5–5.3)
POTASSIUM SERPL-SCNC: 5.7 MMOL/L — HIGH (ref 3.5–5.3)
POTASSIUM SERPL-SCNC: 5.9 MMOL/L
PROC NAME: NORMAL
PROT SERPL-MCNC: 6.8 G/DL — SIGNIFICANT CHANGE UP (ref 6–8.3)
PROT SERPL-MCNC: 7.3 G/DL
PROT SERPL-MCNC: 7.4 G/DL
PROT SERPL-MCNC: 7.4 G/DL — SIGNIFICANT CHANGE UP (ref 6–8.3)
PROT SERPL-MCNC: 7.6 G/DL — SIGNIFICANT CHANGE UP (ref 6–8.3)
PROT SERPL-MCNC: 7.7 G/DL — SIGNIFICANT CHANGE UP (ref 6–8.3)
PROT SERPL-MCNC: 7.7 G/DL — SIGNIFICANT CHANGE UP (ref 6–8.3)
PROT SERPL-MCNC: 7.8 G/DL
PROT SERPL-MCNC: 8.1 G/DL — SIGNIFICANT CHANGE UP (ref 6–8.3)
PROT SERPL-MCNC: 8.4 G/DL — HIGH (ref 6–8.3)
PROT UR-MCNC: ABNORMAL
PROTHROM AB SERPL-ACNC: 23.2 SEC — HIGH (ref 10.5–13.4)
PROTHROM AB SERPL-ACNC: 23.4 SEC — HIGH (ref 10.5–13.4)
PROTHROM AB SERPL-ACNC: 23.5 SEC — HIGH (ref 10.5–13.4)
PT BLD: 19.7 SEC
RAPID RVP RESULT: SIGNIFICANT CHANGE UP
RAPID RVP RESULT: SIGNIFICANT CHANGE UP
RBC # BLD: 2.65 M/UL — LOW (ref 4.2–5.8)
RBC # BLD: 3.2 M/UL — LOW (ref 4.2–5.8)
RBC # BLD: 3.47 M/UL — LOW (ref 4.2–5.8)
RBC # BLD: 3.54 M/UL — LOW (ref 4.2–5.8)
RBC # BLD: 3.91 M/UL — LOW (ref 4.2–5.8)
RBC # BLD: 3.96 M/UL — LOW (ref 4.2–5.8)
RBC # BLD: 3.96 M/UL — LOW (ref 4.2–5.8)
RBC # BLD: 4.05 M/UL — LOW (ref 4.2–5.8)
RBC # BLD: 4.05 M/UL — LOW (ref 4.2–5.8)
RBC # BLD: 4.15 M/UL — LOW (ref 4.2–5.8)
RBC # BLD: 4.23 M/UL — SIGNIFICANT CHANGE UP (ref 4.2–5.8)
RBC # BLD: 4.32 M/UL — SIGNIFICANT CHANGE UP (ref 4.2–5.8)
RBC # FLD: 24.5 % — HIGH (ref 10.3–14.5)
RBC # FLD: 25.2 % — HIGH (ref 10.3–14.5)
RBC # FLD: 25.6 % — HIGH (ref 10.3–14.5)
RBC # FLD: 25.7 % — HIGH (ref 10.3–14.5)
RBC # FLD: 25.9 % — HIGH (ref 10.3–14.5)
RBC # FLD: 26 % — HIGH (ref 10.3–14.5)
RBC # FLD: 26.1 % — HIGH (ref 10.3–14.5)
RBC # FLD: 26.1 % — HIGH (ref 10.3–14.5)
RBC # FLD: 27.4 % — HIGH (ref 10.3–14.5)
RBC # FLD: 28.6 % — HIGH (ref 10.3–14.5)
RBC # FLD: 29.4 % — HIGH (ref 10.3–14.5)
RBC BLD AUTO: ABNORMAL
RBC BLD AUTO: SIGNIFICANT CHANGE UP
RBC CASTS # UR COMP ASSIST: 1 /HPF — SIGNIFICANT CHANGE UP (ref 0–4)
RBC CASTS # UR COMP ASSIST: 4 /HPF — SIGNIFICANT CHANGE UP (ref 0–4)
RBC CASTS # UR COMP ASSIST: 9 /HPF — HIGH (ref 0–4)
RETICS #: 88.4 K/UL — SIGNIFICANT CHANGE UP (ref 25–125)
RETICS/RBC NFR: 2.2 % — SIGNIFICANT CHANGE UP (ref 0.5–2.5)
RH IG SCN BLD-IMP: POSITIVE — SIGNIFICANT CHANGE UP
RH IG SCN BLD-IMP: POSITIVE — SIGNIFICANT CHANGE UP
SAO2 % BLDV: 27.9 % — LOW (ref 67–88)
SAO2 % BLDV: 41 % — LOW (ref 67–88)
SAO2 % BLDV: 42.7 % — LOW (ref 67–88)
SAO2 % BLDV: 56.9 % — LOW (ref 67–88)
SARS-COV-2 RNA SPEC QL NAA+PROBE: SIGNIFICANT CHANGE UP
SARS-COV-2 RNA SPEC QL NAA+PROBE: SIGNIFICANT CHANGE UP
SCHISTOCYTES BLD QL AUTO: SLIGHT — SIGNIFICANT CHANGE UP
SODIUM SERPL-SCNC: 127 MMOL/L
SODIUM SERPL-SCNC: 128 MMOL/L — LOW (ref 135–145)
SODIUM SERPL-SCNC: 131 MMOL/L — LOW (ref 135–145)
SODIUM SERPL-SCNC: 132 MMOL/L
SODIUM SERPL-SCNC: 132 MMOL/L — LOW (ref 135–145)
SODIUM SERPL-SCNC: 133 MMOL/L — LOW (ref 135–145)
SODIUM SERPL-SCNC: 135 MMOL/L — SIGNIFICANT CHANGE UP (ref 135–145)
SODIUM SERPL-SCNC: 138 MMOL/L
SODIUM UR-SCNC: 6 MMOL/L — SIGNIFICANT CHANGE UP
SP GR SPEC: 1.01 — SIGNIFICANT CHANGE UP (ref 1.01–1.02)
SP GR SPEC: 1.02 — SIGNIFICANT CHANGE UP (ref 1.01–1.02)
SP GR SPEC: 1.03 — HIGH (ref 1.01–1.02)
SPECIMEN SOURCE: SIGNIFICANT CHANGE UP
TARGETS BLD QL SMEAR: SIGNIFICANT CHANGE UP
TARGETS BLD QL SMEAR: SLIGHT — SIGNIFICANT CHANGE UP
TARGETS BLD QL SMEAR: SLIGHT — SIGNIFICANT CHANGE UP
TIBC SERPL-MCNC: 160 UG/DL — LOW (ref 220–430)
TROPONIN T, HIGH SENSITIVITY RESULT: 7 NG/L — SIGNIFICANT CHANGE UP (ref 0–51)
TROPONIN T, HIGH SENSITIVITY RESULT: 8 NG/L — SIGNIFICANT CHANGE UP (ref 0–51)
TROPONIN T, HIGH SENSITIVITY RESULT: 9 NG/L — SIGNIFICANT CHANGE UP (ref 0–51)
UIBC SERPL-MCNC: 86 UG/DL — LOW (ref 110–370)
UROBILINOGEN FLD QL: ABNORMAL
UROBILINOGEN FLD QL: ABNORMAL
UROBILINOGEN FLD QL: NEGATIVE — SIGNIFICANT CHANGE UP
VARIANT LYMPHS # BLD: 1.7 % — SIGNIFICANT CHANGE UP (ref 0–6)
WBC # BLD: 11 K/UL — HIGH (ref 3.8–10.5)
WBC # BLD: 11.31 K/UL — HIGH (ref 3.8–10.5)
WBC # BLD: 12.94 K/UL — HIGH (ref 3.8–10.5)
WBC # BLD: 14.61 K/UL — HIGH (ref 3.8–10.5)
WBC # BLD: 14.73 K/UL — HIGH (ref 3.8–10.5)
WBC # BLD: 15.24 K/UL — HIGH (ref 3.8–10.5)
WBC # BLD: 15.45 K/UL — HIGH (ref 3.8–10.5)
WBC # BLD: 15.69 K/UL — HIGH (ref 3.8–10.5)
WBC # BLD: 15.73 K/UL — HIGH (ref 3.8–10.5)
WBC # BLD: 15.9 K/UL — HIGH (ref 3.8–10.5)
WBC # BLD: 17.27 K/UL — HIGH (ref 3.8–10.5)
WBC # FLD AUTO: 11 K/UL — HIGH (ref 3.8–10.5)
WBC # FLD AUTO: 11.31 K/UL — HIGH (ref 3.8–10.5)
WBC # FLD AUTO: 12.94 K/UL — HIGH (ref 3.8–10.5)
WBC # FLD AUTO: 14.61 K/UL — HIGH (ref 3.8–10.5)
WBC # FLD AUTO: 14.73 K/UL — HIGH (ref 3.8–10.5)
WBC # FLD AUTO: 15.24 K/UL — HIGH (ref 3.8–10.5)
WBC # FLD AUTO: 15.45 K/UL — HIGH (ref 3.8–10.5)
WBC # FLD AUTO: 15.69 K/UL — HIGH (ref 3.8–10.5)
WBC # FLD AUTO: 15.73 K/UL — HIGH (ref 3.8–10.5)
WBC # FLD AUTO: 15.9 K/UL — HIGH (ref 3.8–10.5)
WBC # FLD AUTO: 17.27 K/UL — HIGH (ref 3.8–10.5)
WBC UR QL: 1 /HPF — SIGNIFICANT CHANGE UP (ref 0–5)
WBC UR QL: 2 /HPF — SIGNIFICANT CHANGE UP (ref 0–5)
WBC UR QL: 6 /HPF — HIGH (ref 0–5)

## 2023-01-01 PROCEDURE — 82435 ASSAY OF BLOOD CHLORIDE: CPT

## 2023-01-01 PROCEDURE — 82330 ASSAY OF CALCIUM: CPT

## 2023-01-01 PROCEDURE — 87040 BLOOD CULTURE FOR BACTERIA: CPT

## 2023-01-01 PROCEDURE — 74018 RADEX ABDOMEN 1 VIEW: CPT

## 2023-01-01 PROCEDURE — 76705 ECHO EXAM OF ABDOMEN: CPT

## 2023-01-01 PROCEDURE — 80048 BASIC METABOLIC PNL TOTAL CA: CPT

## 2023-01-01 PROCEDURE — 82803 BLOOD GASES ANY COMBINATION: CPT

## 2023-01-01 PROCEDURE — 74177 CT ABD & PELVIS W/CONTRAST: CPT | Mod: 26

## 2023-01-01 PROCEDURE — 84132 ASSAY OF SERUM POTASSIUM: CPT

## 2023-01-01 PROCEDURE — 87086 URINE CULTURE/COLONY COUNT: CPT

## 2023-01-01 PROCEDURE — 99214 OFFICE O/P EST MOD 30 MIN: CPT

## 2023-01-01 PROCEDURE — P9016: CPT

## 2023-01-01 PROCEDURE — 83550 IRON BINDING TEST: CPT

## 2023-01-01 PROCEDURE — 82947 ASSAY GLUCOSE BLOOD QUANT: CPT

## 2023-01-01 PROCEDURE — 74177 CT ABD & PELVIS W/CONTRAST: CPT

## 2023-01-01 PROCEDURE — 85025 COMPLETE CBC W/AUTO DIFF WBC: CPT

## 2023-01-01 PROCEDURE — 99238 HOSP IP/OBS DSCHRG MGMT 30/<: CPT

## 2023-01-01 PROCEDURE — 82272 OCCULT BLD FECES 1-3 TESTS: CPT

## 2023-01-01 PROCEDURE — 71275 CT ANGIOGRAPHY CHEST: CPT | Mod: 26,MA

## 2023-01-01 PROCEDURE — 84295 ASSAY OF SERUM SODIUM: CPT

## 2023-01-01 PROCEDURE — 85027 COMPLETE CBC AUTOMATED: CPT

## 2023-01-01 PROCEDURE — 84300 ASSAY OF URINE SODIUM: CPT

## 2023-01-01 PROCEDURE — 36415 COLL VENOUS BLD VENIPUNCTURE: CPT

## 2023-01-01 PROCEDURE — 85014 HEMATOCRIT: CPT

## 2023-01-01 PROCEDURE — 86850 RBC ANTIBODY SCREEN: CPT

## 2023-01-01 PROCEDURE — 83935 ASSAY OF URINE OSMOLALITY: CPT

## 2023-01-01 PROCEDURE — 93005 ELECTROCARDIOGRAM TRACING: CPT

## 2023-01-01 PROCEDURE — 99215 OFFICE O/P EST HI 40 MIN: CPT

## 2023-01-01 PROCEDURE — 99233 SBSQ HOSP IP/OBS HIGH 50: CPT

## 2023-01-01 PROCEDURE — 84484 ASSAY OF TROPONIN QUANT: CPT

## 2023-01-01 PROCEDURE — 99223 1ST HOSP IP/OBS HIGH 75: CPT

## 2023-01-01 PROCEDURE — 71260 CT THORAX DX C+: CPT | Mod: 26

## 2023-01-01 PROCEDURE — 71260 CT THORAX DX C+: CPT

## 2023-01-01 PROCEDURE — 83540 ASSAY OF IRON: CPT

## 2023-01-01 PROCEDURE — 93970 EXTREMITY STUDY: CPT

## 2023-01-01 PROCEDURE — 85045 AUTOMATED RETICULOCYTE COUNT: CPT

## 2023-01-01 PROCEDURE — 88341 IMHCHEM/IMCYTCHM EA ADD ANTB: CPT

## 2023-01-01 PROCEDURE — 82728 ASSAY OF FERRITIN: CPT

## 2023-01-01 PROCEDURE — 80053 COMPREHEN METABOLIC PANEL: CPT

## 2023-01-01 PROCEDURE — 99232 SBSQ HOSP IP/OBS MODERATE 35: CPT | Mod: GC

## 2023-01-01 PROCEDURE — 99291 CRITICAL CARE FIRST HOUR: CPT

## 2023-01-01 PROCEDURE — 86923 COMPATIBILITY TEST ELECTRIC: CPT

## 2023-01-01 PROCEDURE — 85730 THROMBOPLASTIN TIME PARTIAL: CPT

## 2023-01-01 PROCEDURE — 85018 HEMOGLOBIN: CPT

## 2023-01-01 PROCEDURE — 81001 URINALYSIS AUTO W/SCOPE: CPT

## 2023-01-01 PROCEDURE — 99285 EMERGENCY DEPT VISIT HI MDM: CPT | Mod: 25

## 2023-01-01 PROCEDURE — 0225U NFCT DS DNA&RNA 21 SARSCOV2: CPT

## 2023-01-01 PROCEDURE — 71045 X-RAY EXAM CHEST 1 VIEW: CPT

## 2023-01-01 PROCEDURE — 44360 SMALL BOWEL ENDOSCOPY: CPT | Mod: GC

## 2023-01-01 PROCEDURE — 88342 IMHCHEM/IMCYTCHM 1ST ANTB: CPT

## 2023-01-01 PROCEDURE — 99233 SBSQ HOSP IP/OBS HIGH 50: CPT | Mod: GC

## 2023-01-01 PROCEDURE — 71045 X-RAY EXAM CHEST 1 VIEW: CPT | Mod: 26

## 2023-01-01 PROCEDURE — 80076 HEPATIC FUNCTION PANEL: CPT

## 2023-01-01 PROCEDURE — 85610 PROTHROMBIN TIME: CPT

## 2023-01-01 PROCEDURE — 91110 GI TRC IMG INTRAL ESOPH-ILE: CPT | Mod: 26,52,GC

## 2023-01-01 PROCEDURE — 71275 CT ANGIOGRAPHY CHEST: CPT | Mod: MA

## 2023-01-01 PROCEDURE — 74177 CT ABD & PELVIS W/CONTRAST: CPT | Mod: MA

## 2023-01-01 PROCEDURE — 83010 ASSAY OF HAPTOGLOBIN QUANT: CPT

## 2023-01-01 PROCEDURE — 88341 IMHCHEM/IMCYTCHM EA ADD ANTB: CPT | Mod: 26

## 2023-01-01 PROCEDURE — 74018 RADEX ABDOMEN 1 VIEW: CPT | Mod: 26

## 2023-01-01 PROCEDURE — 45381 COLONOSCOPY SUBMUCOUS NJX: CPT | Mod: GC

## 2023-01-01 PROCEDURE — 45380 COLONOSCOPY AND BIOPSY: CPT | Mod: GC

## 2023-01-01 PROCEDURE — 76705 ECHO EXAM OF ABDOMEN: CPT | Mod: 26

## 2023-01-01 PROCEDURE — 86900 BLOOD TYPING SEROLOGIC ABO: CPT

## 2023-01-01 PROCEDURE — 83605 ASSAY OF LACTIC ACID: CPT

## 2023-01-01 PROCEDURE — 82962 GLUCOSE BLOOD TEST: CPT

## 2023-01-01 PROCEDURE — 99213 OFFICE O/P EST LOW 20 MIN: CPT | Mod: 95

## 2023-01-01 PROCEDURE — 76700 US EXAM ABDOM COMPLETE: CPT | Mod: 26

## 2023-01-01 PROCEDURE — 88305 TISSUE EXAM BY PATHOLOGIST: CPT

## 2023-01-01 PROCEDURE — 97161 PT EVAL LOW COMPLEX 20 MIN: CPT

## 2023-01-01 PROCEDURE — 76700 US EXAM ABDOM COMPLETE: CPT

## 2023-01-01 PROCEDURE — 99205 OFFICE O/P NEW HI 60 MIN: CPT

## 2023-01-01 PROCEDURE — 36430 TRANSFUSION BLD/BLD COMPNT: CPT

## 2023-01-01 PROCEDURE — 74177 CT ABD & PELVIS W/CONTRAST: CPT | Mod: 26,MA

## 2023-01-01 PROCEDURE — 83930 ASSAY OF BLOOD OSMOLALITY: CPT

## 2023-01-01 PROCEDURE — 99285 EMERGENCY DEPT VISIT HI MDM: CPT

## 2023-01-01 PROCEDURE — 86901 BLOOD TYPING SEROLOGIC RH(D): CPT

## 2023-01-01 PROCEDURE — 88342 IMHCHEM/IMCYTCHM 1ST ANTB: CPT | Mod: 26

## 2023-01-01 PROCEDURE — 88305 TISSUE EXAM BY PATHOLOGIST: CPT | Mod: 26

## 2023-01-01 PROCEDURE — 93970 EXTREMITY STUDY: CPT | Mod: 26

## 2023-01-01 PROCEDURE — 83690 ASSAY OF LIPASE: CPT

## 2023-01-01 DEVICE — NET RETRV ROT ROTH 2.5MMX230CM: Type: IMPLANTABLE DEVICE | Status: FUNCTIONAL

## 2023-01-01 RX ORDER — SODIUM CHLORIDE 9 MG/ML
500 INJECTION INTRAMUSCULAR; INTRAVENOUS; SUBCUTANEOUS
Refills: 0 | Status: COMPLETED | OUTPATIENT
Start: 2023-01-01 | End: 2023-01-01

## 2023-01-01 RX ORDER — POLYETHYLENE GLYCOL 3350 17 G/17G
17 POWDER, FOR SOLUTION ORAL DAILY
Refills: 0 | Status: DISCONTINUED | OUTPATIENT
Start: 2023-01-01 | End: 2023-01-01

## 2023-01-01 RX ORDER — DOCUSATE SODIUM 100 MG/1
100 CAPSULE ORAL
Refills: 0 | Status: ACTIVE | COMMUNITY

## 2023-01-01 RX ORDER — REGORAFENIB 40 MG/1
40 TABLET, FILM COATED ORAL DAILY
Qty: 21 | Refills: 0 | Status: DISCONTINUED | COMMUNITY
Start: 2023-01-01 | End: 2023-01-01

## 2023-01-01 RX ORDER — LEVOFLOXACIN 5 MG/ML
1 INJECTION, SOLUTION INTRAVENOUS
Qty: 5 | Refills: 0
Start: 2023-01-01 | End: 2023-01-01

## 2023-01-01 RX ORDER — METHADONE HYDROCHLORIDE 40 MG/1
5 TABLET ORAL ONCE
Refills: 0 | Status: DISCONTINUED | OUTPATIENT
Start: 2023-01-01 | End: 2023-01-01

## 2023-01-01 RX ORDER — LORAZEPAM 0.5 MG/1
0.5 TABLET ORAL 3 TIMES DAILY
Qty: 90 | Refills: 0 | Status: ACTIVE | COMMUNITY
Start: 2023-01-01 | End: 1900-01-01

## 2023-01-01 RX ORDER — SENNA PLUS 8.6 MG/1
2 TABLET ORAL AT BEDTIME
Refills: 0 | Status: DISCONTINUED | OUTPATIENT
Start: 2023-01-01 | End: 2023-01-01

## 2023-01-01 RX ORDER — HYDROMORPHONE HYDROCHLORIDE 2 MG/1
2 TABLET ORAL EVERY 6 HOURS
Qty: 120 | Refills: 0 | Status: ACTIVE | COMMUNITY
Start: 2023-01-01 | End: 1900-01-01

## 2023-01-01 RX ORDER — METHADONE HYDROCHLORIDE 5 MG/1
5 TABLET ORAL
Qty: 60 | Refills: 0 | Status: ACTIVE | COMMUNITY
Start: 2023-01-01 | End: 1900-01-01

## 2023-01-01 RX ORDER — METHADONE HYDROCHLORIDE 40 MG/1
5 TABLET ORAL
Refills: 0 | Status: DISCONTINUED | OUTPATIENT
Start: 2023-01-01 | End: 2023-01-01

## 2023-01-01 RX ORDER — REGORAFENIB 40 MG/1
40 TABLET, FILM COATED ORAL DAILY
Qty: 28 | Refills: 0 | Status: ACTIVE | COMMUNITY
Start: 2023-01-01 | End: 1900-01-01

## 2023-01-01 RX ORDER — FUROSEMIDE 40 MG
20 TABLET ORAL ONCE
Refills: 0 | Status: COMPLETED | OUTPATIENT
Start: 2023-01-01 | End: 2023-01-01

## 2023-01-01 RX ORDER — CEFEPIME 1 G/1
2000 INJECTION, POWDER, FOR SOLUTION INTRAMUSCULAR; INTRAVENOUS ONCE
Refills: 0 | Status: COMPLETED | OUTPATIENT
Start: 2023-01-01 | End: 2023-01-01

## 2023-01-01 RX ORDER — SOD SULF/SODIUM/NAHCO3/KCL/PEG
4000 SOLUTION, RECONSTITUTED, ORAL ORAL ONCE
Refills: 0 | Status: COMPLETED | OUTPATIENT
Start: 2023-01-01 | End: 2023-01-01

## 2023-01-01 RX ORDER — RIVAROXABAN 20 MG/1
20 TABLET, FILM COATED ORAL
Qty: 90 | Refills: 0 | Status: DISCONTINUED | COMMUNITY
End: 2023-01-01

## 2023-01-01 RX ORDER — SODIUM CHLORIDE 9 MG/ML
1000 INJECTION INTRAMUSCULAR; INTRAVENOUS; SUBCUTANEOUS ONCE
Refills: 0 | Status: COMPLETED | OUTPATIENT
Start: 2023-01-01 | End: 2023-01-01

## 2023-01-01 RX ORDER — LEVOFLOXACIN 750 MG/1
750 TABLET, FILM COATED ORAL
Qty: 5 | Refills: 0 | Status: ACTIVE | COMMUNITY
Start: 2023-01-01

## 2023-01-01 RX ORDER — METHADONE HYDROCHLORIDE 40 MG/1
1 TABLET ORAL
Refills: 0 | DISCHARGE

## 2023-01-01 RX ORDER — HYDROMORPHONE HYDROCHLORIDE 2 MG/1
2 TABLET ORAL EVERY 6 HOURS
Qty: 28 | Refills: 0 | Status: DISCONTINUED | COMMUNITY
Start: 2023-01-01 | End: 2023-01-01

## 2023-01-01 RX ORDER — REGORAFENIB 40 MG/1
40 TABLET, FILM COATED ORAL DAILY
Qty: 63 | Refills: 0 | Status: ACTIVE | COMMUNITY
Start: 2023-01-01 | End: 1900-01-01

## 2023-01-01 RX ORDER — SODIUM CHLORIDE 9 MG/ML
2000 INJECTION INTRAMUSCULAR; INTRAVENOUS; SUBCUTANEOUS ONCE
Refills: 0 | Status: COMPLETED | OUTPATIENT
Start: 2023-01-01 | End: 2023-01-01

## 2023-01-01 RX ORDER — REGORAFENIB 40 MG/1
40 TABLET, FILM COATED ORAL DAILY
Qty: 14 | Refills: 0 | Status: DISCONTINUED | COMMUNITY
Start: 2022-01-01 | End: 2023-01-01

## 2023-01-01 RX ORDER — ACETAMINOPHEN 500 MG
1000 TABLET ORAL ONCE
Refills: 0 | Status: COMPLETED | OUTPATIENT
Start: 2023-01-01 | End: 2023-01-01

## 2023-01-01 RX ORDER — LANOLIN ALCOHOL/MO/W.PET/CERES
3 CREAM (GRAM) TOPICAL AT BEDTIME
Refills: 0 | Status: DISCONTINUED | OUTPATIENT
Start: 2023-01-01 | End: 2023-01-01

## 2023-01-01 RX ORDER — POLYETHYLENE GLYCOL 3350 17 G/17G
17 POWDER, FOR SOLUTION ORAL DAILY
Qty: 20 | Refills: 0 | Status: ACTIVE | COMMUNITY
Start: 2023-01-01 | End: 1900-01-01

## 2023-01-01 RX ORDER — TRIFLURIDINE AND TIPIRACIL 20; 8.19 MG/1; MG/1
20-8.19 TABLET, FILM COATED ORAL
Qty: 80 | Refills: 0 | Status: DISCONTINUED | COMMUNITY
Start: 2022-01-01 | End: 2023-01-01

## 2023-01-01 RX ORDER — MAGNESIUM HYDROXIDE 2400 MG/30ML
2400 SUSPENSION ORAL
Qty: 500 | Refills: 0 | Status: ACTIVE | COMMUNITY
Start: 2023-01-01 | End: 1900-01-01

## 2023-01-01 RX ORDER — HYDROMORPHONE HYDROCHLORIDE 2 MG/ML
0.2 INJECTION INTRAMUSCULAR; INTRAVENOUS; SUBCUTANEOUS ONCE
Refills: 0 | Status: DISCONTINUED | OUTPATIENT
Start: 2023-01-01 | End: 2023-01-01

## 2023-01-01 RX ORDER — ONDANSETRON 8 MG/1
4 TABLET, FILM COATED ORAL EVERY 8 HOURS
Refills: 0 | Status: DISCONTINUED | OUTPATIENT
Start: 2023-01-01 | End: 2023-01-01

## 2023-01-01 RX ORDER — SENNA 8.6 MG/1
TABLET, FILM COATED ORAL
Refills: 0 | Status: ACTIVE | COMMUNITY

## 2023-01-01 RX ORDER — REGORAFENIB 40 MG/1
40 TABLET, FILM COATED ORAL DAILY
Qty: 84 | Refills: 2 | Status: DISCONTINUED | COMMUNITY
Start: 2022-01-01 | End: 2023-01-01

## 2023-01-01 RX ORDER — ACETAMINOPHEN 500 MG
650 TABLET ORAL EVERY 6 HOURS
Refills: 0 | Status: DISCONTINUED | OUTPATIENT
Start: 2023-01-01 | End: 2023-01-01

## 2023-01-01 RX ADMIN — SODIUM CHLORIDE 1000 MILLILITER(S): 9 INJECTION INTRAMUSCULAR; INTRAVENOUS; SUBCUTANEOUS at 17:36

## 2023-01-01 RX ADMIN — METHADONE HYDROCHLORIDE 5 MILLIGRAM(S): 40 TABLET ORAL at 06:02

## 2023-01-01 RX ADMIN — SODIUM CHLORIDE 1000 MILLILITER(S): 9 INJECTION INTRAMUSCULAR; INTRAVENOUS; SUBCUTANEOUS at 22:18

## 2023-01-01 RX ADMIN — METHADONE HYDROCHLORIDE 5 MILLIGRAM(S): 40 TABLET ORAL at 19:22

## 2023-01-01 RX ADMIN — CEFEPIME 100 MILLIGRAM(S): 1 INJECTION, POWDER, FOR SOLUTION INTRAMUSCULAR; INTRAVENOUS at 17:36

## 2023-01-01 RX ADMIN — Medication 650 MILLIGRAM(S): at 14:21

## 2023-01-01 RX ADMIN — POLYETHYLENE GLYCOL 3350 17 GRAM(S): 17 POWDER, FOR SOLUTION ORAL at 12:13

## 2023-01-01 RX ADMIN — Medication 650 MILLIGRAM(S): at 18:36

## 2023-01-01 RX ADMIN — SODIUM CHLORIDE 30 MILLILITER(S): 9 INJECTION INTRAMUSCULAR; INTRAVENOUS; SUBCUTANEOUS at 14:36

## 2023-01-01 RX ADMIN — METHADONE HYDROCHLORIDE 5 MILLIGRAM(S): 40 TABLET ORAL at 17:16

## 2023-01-01 RX ADMIN — Medication 650 MILLIGRAM(S): at 17:16

## 2023-01-01 RX ADMIN — Medication 650 MILLIGRAM(S): at 22:23

## 2023-01-01 RX ADMIN — METHADONE HYDROCHLORIDE 5 MILLIGRAM(S): 40 TABLET ORAL at 22:43

## 2023-01-01 RX ADMIN — SENNA PLUS 2 TABLET(S): 8.6 TABLET ORAL at 21:36

## 2023-01-01 RX ADMIN — SENNA PLUS 2 TABLET(S): 8.6 TABLET ORAL at 21:55

## 2023-01-01 RX ADMIN — METHADONE HYDROCHLORIDE 5 MILLIGRAM(S): 40 TABLET ORAL at 05:29

## 2023-01-01 RX ADMIN — Medication 650 MILLIGRAM(S): at 16:55

## 2023-01-01 RX ADMIN — POLYETHYLENE GLYCOL 3350 17 GRAM(S): 17 POWDER, FOR SOLUTION ORAL at 11:11

## 2023-01-01 RX ADMIN — ONDANSETRON 4 MILLIGRAM(S): 8 TABLET, FILM COATED ORAL at 00:44

## 2023-01-01 RX ADMIN — METHADONE HYDROCHLORIDE 5 MILLIGRAM(S): 40 TABLET ORAL at 18:05

## 2023-01-01 RX ADMIN — Medication 4000 MILLILITER(S): at 16:36

## 2023-01-01 RX ADMIN — Medication 10 MILLIGRAM(S): at 21:36

## 2023-01-01 RX ADMIN — Medication 650 MILLIGRAM(S): at 10:58

## 2023-01-01 RX ADMIN — Medication 650 MILLIGRAM(S): at 17:54

## 2023-01-01 RX ADMIN — METHADONE HYDROCHLORIDE 5 MILLIGRAM(S): 40 TABLET ORAL at 17:24

## 2023-01-01 RX ADMIN — Medication 650 MILLIGRAM(S): at 12:07

## 2023-01-01 RX ADMIN — Medication 650 MILLIGRAM(S): at 01:45

## 2023-01-01 RX ADMIN — METHADONE HYDROCHLORIDE 5 MILLIGRAM(S): 40 TABLET ORAL at 18:16

## 2023-01-01 RX ADMIN — METHADONE HYDROCHLORIDE 5 MILLIGRAM(S): 40 TABLET ORAL at 06:12

## 2023-01-01 RX ADMIN — Medication 20 MILLIGRAM(S): at 10:58

## 2023-01-01 RX ADMIN — POLYETHYLENE GLYCOL 3350 17 GRAM(S): 17 POWDER, FOR SOLUTION ORAL at 10:58

## 2023-01-01 RX ADMIN — Medication 650 MILLIGRAM(S): at 14:51

## 2023-01-01 RX ADMIN — Medication 400 MILLIGRAM(S): at 17:36

## 2023-01-01 RX ADMIN — METHADONE HYDROCHLORIDE 5 MILLIGRAM(S): 40 TABLET ORAL at 05:01

## 2023-01-01 RX ADMIN — SODIUM CHLORIDE 4000 MILLILITER(S): 9 INJECTION INTRAMUSCULAR; INTRAVENOUS; SUBCUTANEOUS at 18:41

## 2023-05-08 PROBLEM — K59.00 CONSTIPATION: Status: ACTIVE | Noted: 2023-01-01

## 2023-05-08 NOTE — ASSESSMENT
[FreeTextEntry1] : 37 year old male from [place], presents to [facility] to establish care for metastatic L. sided colon cancer (KRAS WT, BRAF WT, HER2-, SAMAN). s/p 1st line FOLFOX + Linda X 8 cycles, s/p 2nd line FOLOFIRI + Cetuximab X 4 cycles then POD s/p peritonectomy and HIPEC then CAPIRI + Cetuximab X 4 cycles. \par Recent restaging scans 7/2022 with stable appearing peritoneal nodules and increase in size of segment 5 hepatic lesion (15 mm). Recommended by oncologists in [place].\par \par #Metastatic Colon Cancer with Progression of Disease \par - patient's case discussed at GI Tumor Board on 9/14/2022 and radiology images reviewed and compared to his prior ones from [facility] that were done in July 2022. Upon radiology review there is increase in size of liver segment from 2X 1.8cm to 3.4X 2.4cm. Additionally a second subcapsular 1cm right lobe lesion conerning for met and another developing one. Mesenteric masses also found including a left upper pelvis nodule 1.8 X1.7cm and a 1.5cm mesenteric nodule.  Given these findings, there are unfortunately no available clinical trials at [facility] that are recruiting.  Additionally searched on clinicaltrials.gov and was not able to find any phase III studies that are accruing patients. Patient requested a referral to [facility] which was previously placed.\par \par In regards to next line of therapy, patient has progressed on FOLFOX, FOLFIRI with no response to Cetuximab despite KRAS WT status (this was confirmed with his primary oncologist). Have sent liquid biopsy for molecular testing but unfortunately no actionable mutations found.  In the meantime discussed subsequent line of treatment with Regorafenib or Lonsurf +/- Bevacizumab per standard of care treatment options via [facility]CCN. Patient elected Regorafenib. Side effects including but not limited to hypertension, fatigue, diarrhea, nausea/vomiting were reviewed and consent signed.  Will begin with Regorafenib 80mg daily X 1 week and if tolerating will increase to full dose 160mg daily. Plan will be for Regorafenib daily X 21 days per every 28 day cycle.\par \par Communicated plan with patient's primary oncologist Dr. Jeronimo and Dr. Shafquat in [place]. \par \par Patient then decided to return to [facility] and receive treatment there. Per records he received 3 cycles of Lonsurf, 7 cycles of Nivolumab, had progression of disease in the peritoneum, received RT X 5 for toatl of 2000 cGy 4/13-4/19/2023 and had a liver biopsy sent for [facility] testing. \par \par Patient now presents 5/8/2023 to re-establish care. Have reached out to [facility] regards the liver tissue sent for testing from [facility] and they referred me to [email or web address] to email as they handle international reports. Emailed them on 5/8/23 and waiting for response. If there are no actionable mutations will plan for Regorafenib daily X 21 days per 28 day cycle with one week at 80mg before increasing to full dose 160mg.\par - repeat labs: CBC, Comp, CEA\par - repeat scans: CT C/A/P\par \par #Supportive Care\par - for pain: after checking [place] I-Stop have prescribed Methadone 5mg BID. Will add breakthrough Oxycodone 5mg PRN if needed, at this time patient declines. Additionally reviewed role of palliative care services for both pain and supportive care and patient is interested. Referral placed.\par - for constipation: have prescribed Miralax, previously used Lactulose and found himself to be bloated\par \par RTC later this week to discuss treatment\par

## 2023-05-08 NOTE — HISTORY OF PRESENT ILLNESS
[Disease: _____________________] : Disease: [unfilled] [de-identified] : 37 year old male from [place], presents to [facility] to establish care for metastatic L. sided colon cancer (KRAS WT, BRAF WT, HER2-, SAMAN).\par \par Mr. Barrientos is a 38 y/o M with no PMH who initially presented to hospital in his home country of [facility] in January 2021.  At that time he had experienced left lower quadrant abdominal pain followed by bright red blood per rectum. He underwent a sigmoidoscopy which revealed a 25cm mass from the anal verge obstructing the lumen, path confirmed moderately differentiated adenocarcinoma of colon. He subsequently underwent LAR surgery, with primary end to end anstamosis and resection of 2 omental nodes, 18LN+. He then had 1st line FOLFOX +Bevacizumab for 8 cycles, complicated by catheter site thrombosis now on anticoagulation with Xarelto, and evidence of progression of disease. Then had 2nd line treatment with FOLFIRI + Cetuximab X 4 cycles and f/u PET scan showed interval morphological increase in size of peritoneal nodules with one having increased FDG uptake. He then underwent peritonectomy and HIPEC with residual peritoneal nodule noted to have mucinous adenoca and so treated with 4 cycles of CAPIRI + Cetuximab. Recent CT scan 7/16/2022 showed stable appearing periotoneal nodules with an interval increase of segment 5 hepatic lesion (15 mm from 7mm). Plan was for local treatment of liver lesion with resection vs RFA, however, patient did not have this done and on the recommendation of his onc[place] the [place] for a clinical trial.\par \[facility] GI Tumor Board on 9/14/2022. Upon radiology review there is increase in size of liver segment from 2X 1.8cm to 3.4X 2.4cm. Additionally a second subcapsular 1cm right lobe lesion conerning for met and another developing one. Mesenteric masses also found including a left upper pelvis nodule 1.8 X1.7cm and a 1.5cm mesenteric nodule.  Given these findings, there are unfortunately no available clinical trials at [facility] that are recruiting. \par \par 5/8/23: Patient initially presented fall of 2022 for oncology appointment at [facility], then decided to return to his home country of [facility] for further treatment. Per records patient brings with him, he received 3 cycles of Lonsurf, 7 cycles of Nivolumab and repeat scans March 2023 with progression of disease. He also had melena with Hb drop from 12 to 7.6 with work up unrevealing. Subsequently had RT X 5 for a total of 2000cGy to peritoneal mets? from 4/13-4/19/2023. Patient had repeat colonoscopy to send tissue for NGS, however no discrete mass so a liver biopsy was performed 4/26/23 and sent for [facility] Testing, reached out to [employer] who gave me email for their international contact [email or web address]). 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 Barrientos presents today after several months with his wife Ameena. He has had progression of disease after Lonsurf + Nivolumab and received additional RT to periotenal lesions, he has lost significant weight and is experiencing dull, constant abdominal pain rated 6/10. He is also complaining of cough productive of white clear sputum, no fever/chills, no rhinorrhea. Patient has moved to [place] now with his wife and 2 young children, hoping for another line of treatment.  [ECOG Performance Status: 0 - Fully active, able to carry on all pre-disease performance without restriction] : Performance Status: 0 - Fully active, able to carry on all pre-disease performance without restriction

## 2023-05-15 NOTE — ED PROVIDER NOTE - NSFOLLOWUPINSTRUCTIONS_ED_ALL_ED_FT
Please return to the emergency department if you develop worsening fever, worsening chest pain, difficulty breathing, worsening cough, abdominal pain, diarrhea.      Please return to the emergency department if you do not feel well.      Please follow-up with Dr. Velarde this week.      Please take your Levofloxacin as directed, please use Tylenol for fever and pain as directed on the bottle. Please return to the emergency department if you develop worsening fever, worsening chest pain, difficulty breathing, worsening cough, abdominal pain, diarrhea.      Please return to the emergency department if you do not feel well.      Please follow-up with Dr. Velarde this week.      Please take your Levofloxacin as directed, please use Tylenol for fever and pain as directed on the bottle.    Fever in Adults    WHAT YOU NEED TO KNOW:    A fever is an increase in your body temperature. Normal body temperature is 98.6°F (37°C). Fever is generally defined as greater than 100.4°F (38°C). Common causes include an infection, injury, or disease such as arthritis.    DISCHARGE INSTRUCTIONS:    Return to the emergency department if:    Your fever does not go away or gets worse even after treatment.    You have a stiff neck and a bad headache.    You are confused. You may not be able to think clearly or remember things like you normally do.    Your heart beats faster than usual even after treatment.    You have shortness of breath or chest pain when you breathe.    You urinate small amounts or not at all.    Your skin, lips, or nails turn blue.  Contact your healthcare provider if:    You have abdominal pain or you feel bloated.    You have nausea or are vomiting.    You have pain or burning when you urinate, or you have pain in your back.    You have questions or concerns about your condition or care.  Medicines: You may need any of the following:    NSAIDs, such as ibuprofen, help decrease swelling, pain, and fever. This medicine is available with or without a doctor's order. NSAIDs can cause stomach bleeding or kidney problems in certain people. If you take blood thinner medicine, always ask if NSAIDs are safe for you. Always read the medicine label and follow directions. Do not give these medicines to children younger than 6 months without direction from a healthcare provider.    Acetaminophen decreases pain and fever. It is available without a doctor's order. Ask how much to take and how often to take it. Follow directions. Read the labels of all other medicines you are using to see if they also contain acetaminophen, or ask your doctor or pharmacist. Acetaminophen can cause liver damage if not taken correctly.    Antibiotics may be given if you have an infection caused by bacteria.    Take your medicine as directed. Contact your healthcare provider if you think your medicine is not helping or if you have side effects. Tell him or her if you are allergic to any medicine. Keep a list of the medicines, vitamins, and herbs you take. Include the amounts, and when and why you take them. Bring the list or the pill bottles to follow-up visits. Carry your medicine list with you in case of an emergency.  Follow up with your healthcare provider as directed: Write down your questions so you remember to ask them during your visits.    Self-care:    Drink more liquids as directed. A fever makes you sweat. This can increase your risk for dehydration. Liquids can help prevent dehydration.  Drink at least 6 to 8 eight-ounce cups of clear liquids each day. Drink water, juice, or broth. Do not drink sports drinks. They may contain caffeine.    Ask your healthcare provider if you should drink an oral rehydration solution (ORS). An ORS has the right amounts of water, salts, and sugar you need to replace body fluids.    Dress in lightweight clothes. Shivers may be a sign that your fever is rising. Do not put extra blankets or clothes on. This may cause your fever to rise even higher. Dress in light, comfortable clothing. Use a lightweight blanket or sheet when you sleep. Change your clothes, blanket, or sheets if they get wet.    Cool yourself safely. Take a bath in cool or lukewarm water. Use an ice pack wrapped in a small towel or wet a washcloth with cool water. Place the ice pack or wet washcloth on your forehead or the back of your neck.Fever in Adults    WHAT YOU NEED TO KNOW:    A fever is an increase in your body temperature. Normal body temperature is 98.6°F (37°C). Fever is generally defined as greater than 100.4°F (38°C). Common causes include an infection, injury, or disease such as arthritis.    DISCHARGE INSTRUCTIONS:    Return to the emergency department if:    Your fever does not go away or gets worse even after treatment.    You have a stiff neck and a bad headache.    You are confused. You may not be able to think clearly or remember things like you normally do.    Your heart beats faster than usual even after treatment.    You have shortness of breath or chest pain when you breathe.    You urinate small amounts or not at all.    Your skin, lips, or nails turn blue.  Contact your healthcare provider if:    You have abdominal pain or you feel bloated.    You have nausea or are vomiting.    You have pain or burning when you urinate, or you have pain in your back.    You have questions or concerns about your condition or care.  Medicines: You may need any of the following:    NSAIDs, such as ibuprofen, help decrease swelling, pain, and fever. This medicine is available with or without a doctor's order. NSAIDs can cause stomach bleeding or kidney problems in certain people. If you take blood thinner medicine, always ask if NSAIDs are safe for you. Always read the medicine label and follow directions. Do not give these medicines to children younger than 6 months without direction from a healthcare provider.    Acetaminophen decreases pain and fever. It is available without a doctor's order. Ask how much to take and how often to take it. Follow directions. Read the labels of all other medicines you are using to see if they also contain acetaminophen, or ask your doctor or pharmacist. Acetaminophen can cause liver damage if not taken correctly.    Antibiotics may be given if you have an infection caused by bacteria.    Take your medicine as directed. Contact your healthcare provider if you think your medicine is not helping or if you have side effects. Tell him or her if you are allergic to any medicine. Keep a list of the medicines, vitamins, and herbs you take. Include the amounts, and when and why you take them. Bring the list or the pill bottles to follow-up visits. Carry your medicine list with you in case of an emergency.  Follow up with your healthcare provider as directed: Write down your questions so you remember to ask them during your visits.    Self-care:    Drink more liquids as directed. A fever makes you sweat. This can increase your risk for dehydration. Liquids can help prevent dehydration.  Drink at least 6 to 8 eight-ounce cups of clear liquids each day. Drink water, juice, or broth. Do not drink sports drinks. They may contain caffeine.    Ask your healthcare provider if you should drink an oral rehydration solution (ORS). An ORS has the right amounts of water, salts, and sugar you need to replace body fluids.    Dress in lightweight clothes. Shivers may be a sign that your fever is rising. Do not put extra blankets or clothes on. This may cause your fever to rise even higher. Dress in light, comfortable clothing. Use a lightweight blanket or sheet when you sleep. Change your clothes, blanket, or sheets if they get wet.    Cool yourself safely. Take a bath in cool or lukewarm water. Use an ice pack wrapped in a small towel or wet a washcloth with cool water. Place the ice pack or wet washcloth on your forehead or the back of your neck.

## 2023-05-15 NOTE — ED PROVIDER NOTE - PROGRESS NOTE DETAILS
Yelena Valverde, PGY1, MD: pt signed out to me pending repeat lactate and electrolytes after fluid resuscitation, left message with answering service for pt's oncologist Dr Kirby, waiting call back. Dispo pending Yelena Valverde, PGY1, MD:  Discussed with heme-onc fellow Dr. Edgar Ponce regarding patient's case, Dr. Ponce agrees pain-patient can be a candidate for discharge home on Levaquin with outpatient follow-up if no other likely source at this time.  Patient reassessed at bedside, reports feeling better.  Patient without any abdominal tenderness on exam, reports no diarrhea nausea or vomiting.  Discussed with patient conversation with heme-onc fellow, had shared decision making with patient and wife at bedside, pending repeat labs, patient would like to go home on Levaquin with outpatient follow-up with Dr. Velarde.  Patient given strict return precautions.  Patient verbalized understanding.  Pending repeat labs. Yelena Valverde, PGY1, MD: lactate downtrending, per hemeonc, elevated lactate is likely also impart to liver mets and may not completely resolve. Yelena Valverde, PGY1, MD: sodium improved on repeat BMP, pt counselled on strict return precautions and f.u with dr Kirby this week, pt agrees with plan

## 2023-05-15 NOTE — ED ADULT NURSE NOTE - CHPI ED NUR SYMPTOMS NEG
no back pain/no chills/no decreased eating/drinking/no dizziness/no headache/no loss of consciousness/no nausea/no pain/no vomiting

## 2023-05-15 NOTE — ASSESSMENT
[______] : HCP: [unfilled] [FreeTextEntry1] : 37yoM with:\par \par # Metastatic Colon Cancer - On Regorafenib. Med onc follow up. \par \par # Pain 2/2 Neoplasm - suggest patient lower morning methadone dose to 2.5mg, c/w 5mg at bedtime, in order to seek out lowest effective dose. \par \par # Fevers - Patient is tachycardic to 150 in the office today, with a temperature of 101.6F.  He is due to get blood and urine cultures done, consensus was made with the Medical Oncology team to send patient to the hospital to get complete workup done as there is concern for sepsis.  Patient is in agreement with this plan. EMS will be called to bring him to [facility] ER. \par \par # Encounter for palliative care- Emotional support provided \par Explained the role of palliative care in enhancing quality of life in the setting of serious illness.\par \par Follow up in 1 month, call sooner with questions or issues.

## 2023-05-15 NOTE — ED PROVIDER NOTE - CLINICAL SUMMARY MEDICAL DECISION MAKING FREE TEXT BOX
37M with pmh metastatic colon ca presenting with fever and productive cough since yesterday. Pt noting fever up to 102. Cough productive of yellow sputum. GIven pt is currently undergoing chemotherapy will cage for neutropenic fever. Draw sepsis labs, start cefepime cxr, ua ucx likely tba depenign on patients preference

## 2023-05-15 NOTE — ED ADULT NURSE NOTE - NSFALLUNIVINTERV_ED_ALL_ED
Bed/Stretcher in lowest position, wheels locked, appropriate side rails in place/Call bell, personal items and telephone in reach/Instruct patient to call for assistance before getting out of bed/chair/stretcher/Non-slip footwear applied when patient is off stretcher/[place] to call system/Physically safe environment - no spills, clutter or unnecessary equipment/Purposeful proactive rounding/Room/bathroom lighting operational, light cord in reach

## 2023-05-15 NOTE — HISTORY OF PRESENT ILLNESS
[FreeTextEntry1] : 37yoM with Metastatic Colon Cancer presents for initial palliative care visit, referred by oncology. \par No significant past medical history. \par \[facility] Patient initially diagnosed January 2021 while living in [place]. At that time he had experienced left lower quadrant abdominal pain followed by [facility] He underwent a sigmoidoscopy which revealed a 25cm mass from the anal verge obstructing the lumen, path confirmed moderately differentiated adenocarcinoma of colon. He subsequently underwent LAR surgery, with primary end to end anstamosis and resection of 2 omental nodes, 18LN+. He then had 1st line FOLFOX +Bevacizumab for 8 cycles, complicated by catheter site thrombosis now on anticoagulation with Xarelto, and evidence of progression of disease. Then had 2nd line treatment with FOLFIRI + Cetuximab X 4 cycles and f/u PET scan showed interval morphological increase in size of peritoneal nodules with one having increased FDG uptake. He then underwent peritonectomy and HIPEC with residual peritoneal nodule noted to have mucinous adenoca and so treated with 4 cycles of CAPIRI + Cetuximab. Recent CT scan 7/16/2022 showed stable appearing periotoneal nodules with an interval increase of segment 5 hepatic lesion (15 mm from 7mm). Plan was for local treatment of liver lesion with resection vs RFA, however, patient did not have this done and on the recommendation of his oncologists came [place] the [place] 9/2022 for a clinical trial.\[facility] GI Tumor Board on 9/14/2022. Upon radiology review there is increase in size of liver segment from 2X 1.8cm to 3.4X 2.4cm. Additionally a second subcapsular 1cm right lobe lesion concerning for met and another developing one. Mesenteric masses also found including a left upper pelvis nodule 1.8 X 1.7cm and a 1.5cm mesenteric nodule. Given these findings, there are unfortunately no available clinical trials at [facility] that are recruiting. \par \[facility] Patient initially presented fall of 2022 for oncology appointment at [facility], then decided to return to his home country of [place] for further treatment. Per records patient brings with him, he received 3 cycles of Lonsurf, 7 cycles of Nivolumab and repeat scans March 2023 with progression of disease. He also had melena with Hb drop from 12 to 7.6 with work up unrevealing. Subsequently had RT X 5 for a total of 2000cGy to peritoneal mets? from 4/13-4/19/2023. \par \par Main reason for which patient is referred is symptom management. \par He experiences RUQ pain which is presently controlled on methadone 5mg BID. He reports having no breakthrough pain. Reports fever of 101F yesterday and again this morning. \par \par ROS:\par +[place] - has to stop and catch breath on occasion\par +cough with occasional production of thick sputum for about the last two weeks\par +constipation - uses miralax once daily and this helps keep him regular\par +he lost about 12kg in [place], weight is presently stable\par +fatigue - takes naps during the day\par +mood is hopeful, positive. \par Denies n/v, trouble sleeping, \par \par Walks a lot in an attempt to stay physically active. Ambulates without assistive device, has not had any falls. \par \par Patient is , lives with his wife, his two children (ages 6 and 4), his brother and sister in law. He was working in [place] prior to moving, is not presently working. He is not driving, his brother usually helps.\par He is Yarsani but does not consider himself very Yarsani. Enjoys spending time with his children. \par \par I-STOP Ref#:  [id number]

## 2023-05-15 NOTE — ED PROVIDER NOTE - ATTENDING CONTRIBUTION TO CARE
This is a patient who has metastatic colon cancer and is currently undergoing palliative treatments at this time.  He was found to have a fever at home and was sent in today.  The patient has no focality to his symptoms and is not coughing having abdominal pain rashes urinary symptoms or other clear cause for the fever.  Patient was found to have essentially okay blood work and is not neutropenic.  The patient strongly would prefer to go home and we discussed this with his oncology team.  They stated that in this context it is reasonable to allow the patient to discharge as long as he goes home on levofloxacin.  Will do so particularly given the patient's prognosis and goals of care.  Patient advised to follow-up promptly with his oncology team as blood cultures are pending and he will need to answer the phone if they come back abnormal.

## 2023-05-15 NOTE — ED PROVIDER NOTE - PHYSICAL EXAMINATION
PHYSICAL EXAM:  GEN: NAD   HEAD: Atraumatic  EYES: Clear bilaterally, pupils equal, round and reactive to light.  ENMT: Airway patent, Nasal mucosa clear. Mouth with normal mucosa. Uvula is midline.   CARDIAC: tachycardia   RESPIRATORY: Breathing unlabored, lungs clear   ABDOMEN:  Soft, nontender, nondistended.  NEUROLOGICAL: Alert and oriented, grossly nonfocal   SKIN: Skin warm and dry. No evidence of rashes or lesions.

## 2023-05-15 NOTE — DATA REVIEWED
[FreeTextEntry1] : CT C/A/P  (05/09/2023): \par \par COMPARISON: CT scan of the chest, abdomen and pelvis from 9/8/2022\par \par FINDINGS:\par CHEST:\par LUNGS AND LARGE AIRWAYS: Patent central airways. No pulmonary nodules.\par PLEURA: No pleural effusion.\par VESSELS: Within normal limits.\par HEART: Heart size is normal. No pericardial effusion.\par MEDIASTINUM AND YULIANA: No lymphadenopathy.\par CHEST WALL AND LOWER NECK: Within normal limits.\par \par ABDOMEN AND PELVIS:\par LIVER: Innumerable large hepatic metastases replacing much of the liver which have markedly increased when compared with the prior examination. The largest lesion is seen occupying the right and left lobes measuring up to 15.4 x 13.4 cm on series 4 image 14 which was not identified on the prior examination. A single 3.4 x 2.40 on lesion was seen on the prior study in segment 5.\par BILE DUCTS: Normal caliber.\par GALLBLADDER: Contracted.\par SPLEEN: Within normal limits.\par PANCREAS: Within normal limits.\par ADRENALS: Within normal limits.\par KIDNEYS/URETERS: 3 mm nonobstructing stone in the midpole of the right kidney.\par \par BLADDER: Within normal limits.\par REPRODUCTIVE ORGANS: Prostate gland is not significantly enlarged.\par \par BOWEL: No bowel obstruction. Appendix not well visualized. No inflammation seen in the right lower quadrant. Sutures in the sigmoid colon presumably from prior resection. Nodular wall thickening in the distal descending/proximal sigmoid colon which was not identified previously and is concerning for neoplasm.\par PERITONEUM: Enlargement or development of multiple intra-abdominal metastases. 6.0 x 4.8 cm metastasis in the left anterior abdomen, previously measuring 1.0 cm. Additional cystic and solid appearing implants not seen previously. For example, 6.6 x 6.3 cm implant in the upper pelvis to the left of midline on series 4 image 172.\par VESSELS: Within normal limits.\par RETROPERITONEUM/LYMPH NODES: No lymphadenopathy.\par ABDOMINAL WALL: 4.3 x 3.2 cm implant in the left anterior abdominal wall inferiorly was not seen previously.\par BONES: Within normal limits.\par \par IMPRESSION:\par Marked increase in size and number of liver metastases as well as intra-abdominal tumor implants as above. Nodular wall thickening in the distal descending/proximal sigmoid colon not seen previously which is highly concerning for neoplasm.

## 2023-05-15 NOTE — ED PROVIDER NOTE - OBJECTIVE STATEMENT
37M with pmh metastatic colon ca presenting with fever and productive cough since yesterday. Pt noting fever up to 102. Cough productive of yellow sputum. Pt currently on chemotherapy agent stivarya. Oncologist Ashok Velarde, pcp Edmundo whiting. Denies cp sob nvd urinary sx.

## 2023-05-15 NOTE — ED PROVIDER NOTE - CARE PROVIDER_API CALL
Meryl Kirby)  Internal Medicine  [address] [place] [address]  Phone: [phone]  Fax: [phone]  Follow Up Time: 1-3 Days

## 2023-05-15 NOTE — PHYSICAL EXAM
[General Appearance - Alert] : alert [General Appearance - In No Acute Distress] : in no acute distress [Sclera] : the sclera and conjunctiva were normal [Normal Oral Mucosa] : normal oral mucosa [Neck Appearance] : the appearance of the neck was normal [] : no respiratory distress [Auscultation Breath Sounds / Voice Sounds] : lungs were clear to auscultation bilaterally [Heart Sounds] : normal S1 and S2 [Edema] : there was no peripheral edema [Bowel Sounds] : normal bowel sounds [Abdomen Soft] : soft [Abdomen Tenderness] : non-tender [No Focal Deficits] : no focal deficits [Oriented To Time, Place, And Person] : oriented to person, place, and time [Affect] : the affect was normal [FreeTextEntry1] : +warm

## 2023-05-15 NOTE — ED ADULT NURSE NOTE - OBJECTIVE STATEMENT
37y M BIBEMS from home p/w fever. Pt is axo4, hx of Colon cancer. Pt mentions that the fever started last night, T-Max at home was 102. Pt mentions that he only takes chemo tablets (stivarga) BID, no longer receiving IV chemo. Patient undressed and placed into gown, call bell in hand and side rails up with bed in lowest position for safety. blanket provided. Comfort and safety provided. Placed on cardiac monitor, NSR.

## 2023-05-22 NOTE — ED ADULT NURSE NOTE - NSFALLUNIVINTERV_ED_ALL_ED
Bed/Stretcher in lowest position, wheels locked, appropriate side rails in place/Call bell, personal items and telephone in reach/Instruct patient to call for assistance before getting out of bed/chair/stretcher/Non-slip footwear applied when patient is off stretcher/[place] to call system/Physically safe environment - no spills, clutter or unnecessary equipment/Purposeful proactive rounding/Room/bathroom lighting operational, light cord in reach

## 2023-05-22 NOTE — ED PROVIDER NOTE - PHYSICAL EXAMINATION
GENERAL: Awake, alert, NAD  HEENT: NC/AT, moist mucous membranes, PERRL, EOMI  LUNGS: Tachypneic, crackles to L base, no wheezes or crackles   CARDIAC: Tachycardic, RR, no m/r/g  ABDOMEN: Soft, normal BS, no rebound, no guarding. Midline abdominal scar. Distended. Mild epigastric tenderness.   BACK: No midline spinal tenderness, no CVA tenderness  EXT: 1+ pitting edema to L ankle. no calf tenderness, 2+ DP pulses bilaterally, no deformities.  NEURO: A&Ox3. Moving all extremities.  SKIN: Warm and dry. No rash. Pale.   PSYCH: Normal affect.

## 2023-05-22 NOTE — ED ADULT NURSE NOTE - OBJECTIVE STATEMENT
38 y/o M w/ PMH of colon CA, came w/ complaints of abnormal lab results. Pt also reports abd pain upon palpation, weakness, and dizziness. Pt denies burning sensation upon urination. Per pt's wife, pt is not currently on chemo. Pt a/ox4, skin warm dry and intact, lung sounds clear, s1 s2 sounds present, abd distended and tenderness upon palpation. IV 20g placed in L forearm.

## 2023-05-22 NOTE — ED PROVIDER NOTE - ATTENDING CONTRIBUTION TO CARE
I, Rubio Gunn, performed a history and physical exam of the patient and discussed their management with the resident and/or advanced care provider. I reviewed the resident and/or advanced care provider's note and agree with the documented findings and plan of care. I was present and available for all procedures.    37-year-old male with past medical history of metastatic colon cancer presenting with symptomatic anemia.  Patient was at routine checkup with oncologist today to discuss restarting oral chemotherapy, when routine labs showed hemoglobin of 5.5.  Patient endorses fatigue, dizziness, pallor, and occasional dyspnea.  Also notes abdominal distention, and new left lower extremity edema.  Of note, patient was recently admitted for a pneumonia, at which time his oral chemotherapy (stivarya) was discontinued.  Patient still has residual productive cough of white sputum, but denies fevers.  Denies dark or bloody stools, constipation, hematuria or dysuria, chest pain, syncope.    Well appearing and in NAD, head normal appearing atraumatic, trachea midline, no respiratory distress, lungs cta bilaterally, rrr no murmurs, soft mild epigastric ttp, mildly distended no lq ttp no rt abdomen, no visible extremity deformities, Alert and oriented, non focal neuro exam, skin warm and dry, normal affect and mood, mild bl leg swelling 1+ bilaterally wo calf ttp or jvd    Concerning for symptomatic anemia with weakness fatigue as well as routine hemoglobin 5.5 we will transfuse otherwise has abdominal distention and as well as new lower extremity edema this could possibly be related to recent admission with some deconditioning or this could be related to a DVT and PE especially with the shortness of breath and tachycardia patient has never had a PE before but is on chemotherapy and does have cancer so we will evaluate with a CTA rule out PE as well as CT abdomen pelvis with IV contrast rule out intra-abdominal pathology with recent fevers otherwise work-up with urine screening blood work transfusion admit for further management discussed with patient and wife at bedside agreeable with plan unlikely ACS pneumothorax dissection AAA

## 2023-05-22 NOTE — ED PROVIDER NOTE - OBJECTIVE STATEMENT
37-year-old male with past medical history of metastatic colon cancer presenting with symptomatic anemia.  Patient was at routine checkup with oncologist today to discuss restarting oral chemotherapy, when routine labs showed hemoglobin of 5.5.  Patient endorses fatigue, dizziness, pallor, and occasional dyspnea.  Also notes abdominal distention, and new left lower extremity edema.  Of note, patient was recently admitted for a pneumonia, at which time his oral chemotherapy (stivarya) was discontinued.  Patient still has residual productive cough of white sputum, but denies fevers.  Denies dark or bloody stools, constipation, hematuria or dysuria, chest pain, syncope.

## 2023-05-22 NOTE — ED PROVIDER NOTE - CCCP TRG CHIEF CMPLNT
I reviewed the H&P, I examined the patient, and there are no changes in the patient's condition.  Adán Hinds MD    
I reviewed the ROS and social hx, and there are no changes in the patient's condition.  Adán Hinds MD    
abnormal lab result

## 2023-05-22 NOTE — END OF VISIT
[FreeTextEntry3] : Pt presented for hydration and count check. Hb noted to be 5.5. Recommend EMS to transfer pt to [facility] for STAT blood transfusion and further work up.

## 2023-05-22 NOTE — HISTORY OF PRESENT ILLNESS
[Disease: _____________________] : Disease: [unfilled] [Treatment Protocol] : Treatment Protocol [Therapy: ___] : Therapy: [unfilled] [Cycle: ___] : Cycle: [unfilled] [Day: ___] : Day: [unfilled] [ECOG Performance Status: 2 - Ambulatory and capable of all self care but unable to carry out any work activities] : Performance Status: 2 - Ambulatory and capable of all self care but unable to carry out any work activities. Up and about more than 50% of waking hours [de-identified] : CEAn 7/2022= 20.2 [de-identified] : 37 year old male from [place], presents to [facility] to establish care for metastatic L. sided colon cancer (KRAS WT, BRAF WT, HER2-, SAMAN).\par \par Mr. Barrientos is a 38 y/o M with no PMH who initially presented to hospital in his home country of [facility] in January 2021.  At that time he had experienced left lower quadrant abdominal pain followed by bright red blood per rectum. He underwent a sigmoidoscopy which revealed a 25cm mass from the anal verge obstructing the lumen, path confirmed moderately differentiated adenocarcinoma of colon. He subsequently underwent LAR surgery, with primary end to end anstamosis and resection of 2 omental nodes, 18LN+. He then had 1st line FOLFOX +Bevacizumab for 8 cycles, complicated by catheter site thrombosis now on anticoagulation with Xarelto, and evidence of progression of disease. Then had 2nd line treatment with FOLFIRI + Cetuximab X 4 cycles and f/u PET scan showed interval morphological increase in size of peritoneal nodules with one having increased FDG uptake. He then underwent peritonectomy and HIPEC with residual peritoneal nodule noted to have mucinous adenoca and so treated with 4 cycles of CAPIRI + Cetuximab. Recent CT scan 7/16/2022 showed stable appearing periotoneal nodules with an interval increase of segment 5 hepatic lesion (15 mm from 7mm). Plan was for local treatment of liver lesion with resection vs RFA, however, patient did not have this done and on the recommendation of his oncologists came [place] the [place] for a clinical trial.\par \[facility] GI Tumor Board on 9/14/2022. Upon radiology review there is increase in size of liver segment from 2X 1.8cm to 3.4X 2.4cm. Additionally a second subcapsular 1cm right lobe lesion conerning for met and another developing one. Mesenteric masses also found including a left upper pelvis nodule 1.8 X1.7cm and a 1.5cm mesenteric nodule.  Given these findings, there are unfortunately no available clinical trials at [facility] that are recruiting. \par \par 5/8/23: Patient initially presented fall of 2022 for oncology appointment at [facility], then decided to return to his home country of [facility] for further treatment. Per records patient brings with him, he received 3 cycles of Lonsurf, 7 cycles of Nivolumab and repeat scans March 2023 with progression of disease. He also had melena with Hb drop from 12 to 7.6 with work up unrevealing. Subsequently had RT X 5 for a total of 2000cGy to peritoneal mets? from 4/13-4/19/2023. Patient had repeat colonoscopy to send tissue for NGS, however no discrete mass so a liver biopsy was performed 4/26/23 and sent for [facility] Testing, reached out to [facility] who gave me email for their international contact ([email or web address]). \par \par 5/22/23: Patient returned 5/8/2023 to re-establish care after having been in [facility] for several months. . Awaiting [facility] results regards the liver tissue sent for testing from [facility]. Pt contacted office this morning requesting evaluation of increased abdominal girth.  Pt started Regorafenib daily X 21 days per 28 day cycle with one week at 80mg before increasing to full dose 160mg. He only completed 3-4 days before being sent to ED for fever. He was discharged home on antibiotics for pneumonia. He completed course 2 days ago. Regorafenib has been held since ED visit.  Pain is well managed on current regimen. He appears very pale, dehydrated and eyes slightly icteric. Pt reports appetite is good. No changes to bowel function. Stool normal. Urine clear yellow. No fevers since started antibiotics. Pt noted some swelling L ankle this morning. Denies calf tenderness or discomfort. No discoloration or erythema. \par \par PMH/PSH: as above\par Social Hx: has family in [place] (older brother and mother), otherwise wife and 2 young children (son Ravi 3, daughter Jacquie 5) live in [facility], works in  for oil company\par Family Hx: no known history\par Allergies: NKDA\par Medications: taking none at this time\par  [de-identified] : Mr. Barrientos presents today after several months with his wife Ameena. He has had progression of disease after Lonsurf + Nivolumab and received additional RT to periotenal lesions, he has lost significant weight and is experiencing dull, constant abdominal pain rated 6/10. He is also complaining of cough productive of white clear sputum, no fever/chills, no rhinorrhea. Patient has moved to [place] now with his wife and 2 young children, hoping for another line of treatment.

## 2023-05-22 NOTE — PHYSICAL EXAM
[Ambulatory and capable of all self care but unable to carry out any work activities] : Status 2- Ambulatory and capable of all self care but unable to carry out any work activities. Up and about more than 50% of waking hours [Thin] : thin [Normal] : affect appropriate [de-identified] : ? mild icterus [de-identified] : Dry oral mucosa [de-identified] : Tachy ~ 100-110 [de-identified] : Distended NT (+) firmness  [de-identified] : Trace L. Ankle edema. No paiin (-) Kimmy's sign.

## 2023-05-22 NOTE — ASSESSMENT
[Palliative] : Goals of care discussed with patient: Palliative [FreeTextEntry1] : 37 year old male from [place], presents to [facility] to establish care for metastatic L. sided colon cancer (KRAS WT, BRAF WT, HER2-, SAMAN). s/p 1st line FOLFOX + Linda X 8 cycles, s/p 2nd line FOLOFIRI + Cetuximab X 4 cycles then POD s/p peritonectomy and HIPEC then CAPIRI + Cetuximab X 4 cycles. \par Recent restaging scans 7/2022 with stable appearing peritoneal nodules and increase in size of segment 5 hepatic lesion (15 mm). Recommended by oncologists in [place] in [place] for clinical [place].\par \par #Metastatic Colon Cancer with Progression of Disease \par - patient's case discussed at GI Tumor Board on 9/14/2022 and radiology images reviewed and compared to his prior ones from [facility] that were done in July 2022. Upon radiology review there is increase in size of liver segment from 2X 1.8cm to 3.4X 2.4cm. Additionally a second subcapsular 1cm right lobe lesion conerning for met and another developing one. Mesenteric masses also found including a left upper pelvis nodule 1.8 X1.7cm and a 1.5cm mesenteric nodule.  Given these findings, there are unfortunately no available clinical trials at [facility] that are recruiting.  Additionally searched on clinicaltrials.gov and was not able to find any phase III studies that are accruing patients. Patient requested a referral to [facility] which was previously placed.\par \par In regards to next line of therapy, patient has progressed on FOLFOX, FOLFIRI with no response to Cetuximab despite KRAS WT status (this was confirmed with his primary oncologist). Have sent liquid biopsy for molecular testing but unfortunately no actionable mutations found.  In the meantime discussed subsequent line of treatment with Regorafenib or Lonsurf +/- Bevacizumab per standard of care treatment options via [facility]. Patient elected Regorafenib. Side effects including but not limited to hypertension, fatigue, diarrhea, nausea/vomiting were reviewed and consent signed.  Will begin with Regorafenib 80mg daily X 1 week and if tolerating will increase to full dose 160mg daily. Plan will be for Regorafenib daily X 21 days per every 28 day cycle.\par \par Communicated plan with patient's primary oncologist Dr. Jeronimo and Dr. Shafquat in [place]. \par \par Patient then decided to return to [facility] and receive treatment there. Per records he received 3 cycles of Lonsurf, 7 cycles of Nivolumab, had progression of disease in the peritoneum, received RT X 5 for total of 2000 cGy 4/13-4/19/2023 and had a liver biopsy sent for [facility]. \par \par Patient returned 5/8/2023 to re-establish care. Awaiting [facility] results regards the liver tissue sent for testing from [facility]. Pt started Regorafenib daily X 21 days per 28 day cycle with one week at 80mg before increasing to full dose 160mg. He only completed 3-4 days before being sent to ED for fever. He was discharged home on antibiotics for pneumonia. He completed course 2 days ago. Regorafenib has been held since ED visit. Pt has noted increased abdominal girth and associated firmness. Pain is well managed on current regimen. He appears very pale, dehydrated and eyes slightly icteric. \par -IV hydration today\par - repeat labs: CBC, Comp, LDH, type and cross. \par - Monitor swelling L.ankle. NO suspicion of DVT at this time. Pt instructed to notify office immediately if swelling increases or any calf tenderness develops. \par - Concern for possible development of ascites. Will monitor closely. \par \par \par #Supportive Care\par - for pain: Continue Methadone 5mg BID. Pain is well controlled. \par - F/u with pall care \par - for constipation: have prescribed Miralax, previously used Lactulose and found himself to be bloated\par \par RTC on 5/30 to see Dr. Kirby. \par

## 2023-05-22 NOTE — ED PROVIDER NOTE - CLINICAL SUMMARY MEDICAL DECISION MAKING FREE TEXT BOX
37-year-old male with past medical history of metastatic colon cancer presenting with symptomatic anemia. Patient tachycardic, tachypneic, but normotensive and nonhypoxic on room air.  Exam significant for abdominal distention, mild epigastric abdominal pain, 1+ pitting edema to left lower extremity, crackles at the left lung base.  Differential includes but not limited to recurrent pneumonia versus PE versus symptomatic anemia.  Will obtain CT chest given recent history of pneumonia, labs, left lower extremity duplex to rule out DVT, transfuse.  Plan for admission. 37-year-old male with past medical history of metastatic colon cancer presenting with symptomatic anemia. Patient tachycardic, tachypneic, but normotensive and nonhypoxic on room air.  Exam significant for abdominal distention, mild epigastric abdominal pain, 1+ pitting edema to left lower extremity, crackles at the left lung base.  Differential includes but not limited to recurrent pneumonia versus PE versus symptomatic anemia.  Less likely acute intra-abdominal process or retroperitoneal bleed.  Will obtain CT chest given recent history of pneumonia and to r/o PE, CT A/P given surgical history and abdominal distension, labs, left lower extremity duplex to rule out DVT, transfuse.  Plan for admission. 37-year-old male with past medical history of metastatic colon cancer presenting with symptomatic anemia. Patient tachycardic, tachypneic, but normotensive and nonhypoxic on room air.  Exam significant for abdominal distention, mild epigastric abdominal pain, 1+ pitting edema to left lower extremity, crackles at the left lung base.  Differential includes but not limited to recurrent pneumonia versus PE versus symptomatic anemia.  Less likely acute intra-abdominal process or retroperitoneal bleed.  Will obtain CT chest given recent history of pneumonia and to r/o PE, CT A/P given surgical history and abdominal distension, labs, left lower extremity duplex to rule out DVT, transfuse.  Plan for admission.    pettet attending- see attending attestation for my mdm

## 2023-05-22 NOTE — ED PROVIDER NOTE - NS ED ROS FT
CONST: no fevers, no chills, +fatigue  EYES: no pain, no vision changes  ENT: no sore throat, no ear pain, no change in hearing  CV: no chest pain, no leg swelling  RESP: + shortness of breath, +productive cough  ABD: no abdominal pain, no nausea, no vomiting, no diarrhea, +abdominal distension  : no dysuria, no flank pain, no hematuria  MSK: no back pain, no extremity pain  NEURO: no headache, +dizziness  HEME: no easy bleeding  SKIN:  no rash, +pallor

## 2023-05-22 NOTE — REVIEW OF SYSTEMS
[Anxiety] : anxiety [Fatigue] : fatigue [Shortness Of Breath] : shortness of breath [SOB on Exertion] : shortness of breath during exertion [Negative] : Neurological [FreeTextEntry6] : occasional  [FreeTextEntry7] : distention.

## 2023-05-23 NOTE — H&P ADULT - HISTORY OF PRESENT ILLNESS
37y M pmh  metastatic colon cancer presenting with symptomatic anemia.  Patient was at routine checkup with oncologist today to discuss restarting oral chemotherapy, when routine labs showed hemoglobin of 5.5.  Patient endorses fatigue, dizziness, pallor, and occasional dyspnea.  Also notes abdominal distention, and new left lower extremity edema.  Of note, patient was recently admitted for a pneumonia, at which time his oral chemotherapy (stivarya) was discontinued.  Patient still has residual productive cough of white sputum, but denies fevers.  Denies dark or bloody stools, constipation, hematuria or dysuria, chest pain, syncope 37y M pmh  metastatic stage 4 colon cancer presenting with symptomatic anemia.  Patient was at routine checkup with oncologist today to discuss restarting oral chemotherapy, when routine labs showed hemoglobin of 5.5.  Patient endorses fatigue, dizziness, pallor, and occasional dyspnea.  Also notes abdominal distention, and new left lower extremity edema.  Of note, patient was recently admitted for a pneumonia, at which time his oral chemotherapy (stivarya) was discontinued.  Patient still has residual productive cough of white sputum, but denies fevers, chills, N/V, dark or bloody stools, constipation, hematuria or dysuria, chest pain, syncope, change in bowel or urinary habits     ED: given 1L NS bolus, 2UPrbc ongoing, methadone 5mg

## 2023-05-23 NOTE — H&P ADULT - NSHPLABSRESULTS_GEN_ALL_CORE
6.6    15.69 )-----------( 364      ( 22 May 2023 22:42 )             21.1             132<L>  |  99  |  13  ----------------------------<  70  4.4   |  19<L>  |  0.64    Ca    8.4      22 May 2023 22:42    TPro  8.1  /  Alb  2.4<L>  /  TBili  2.6<H>  /  DBili  x   /  AST  126<H>  /  ALT  31  /  AlkPhos  566<H>          Urinalysis Basic - ( 22 May 2023 22:42 )    Color: Dark Yellow / Appearance: Slightly Turbid / S.019 / pH: x  Gluc: x / Ketone: Negative  / Bili: Small / Urobili: 3 mg/dL   Blood: x / Protein: 30 mg/dL / Nitrite: Negative   Leuk Esterase: Negative / RBC: 9 /hpf / WBC 2 /HPF   Sq Epi: x / Non Sq Epi: x / Bacteria: Negative      - - - - - - - - - - - - - - - - - - - - - - - - - - - - - - - - - - - - - - - - - - - - - - - - - - - - - - - - - - - - - - - - - - - - - - - - - - - - - - - - - - - - - - - - - - - - - - - - - - - - - - - - -       EKG personally reviewed:  Sinus tachy    Images personally reviewed:     < from: Xray Chest 1 View- PORTABLE-Urgent (23 @ 19:02) >  Impression: Clear lungs.      < from: VA Duplex Lower Ext Vein Scan, Bilat (23 @ 20:20) >  IMPRESSION: No evidence of deep venous thrombosis in either lower extremity.      < from: CT Angio Chest PE Protocol w/ IV Cont (23 @ 22:06) >  IMPRESSION:  Suboptimal study for evaluation of pulmonary embolism. No main, right,   left or lobar pulmonary embolism.    No acute findings in the abdomen or pelvis.  Grossly stable metastatic disease involving the liver, peritoneal   implants and descending colon.    Small volume ascites, slightly increased.

## 2023-05-23 NOTE — PHYSICAL THERAPY INITIAL EVALUATION ADULT - ADDITIONAL COMMENTS
Pt reports living at home with spouse, and kids; +4 steps to enter with HR, +1st floor living, spouse is always with pt and can assist as needed, +tub shower with shower chair and grabbars; use of uber or brother for transportation

## 2023-05-23 NOTE — H&P ADULT - PROBLEM SELECTOR PLAN 1
P/w symptomatic anemia  (fatigue, dizziness, dyspnea)   - Found to have Hg of 5.5 on O/P lab   -  Hx significant for metastatic Colon ca  - Hg here 6.6,    - EKG sinus tachy   - CT: no acute finding in abm, stable metastatic dz , small volume ascites  - Given 1L NS bolus in ED & 2L PRBC ordered, blood transfusion ongoing at time of encounter  - Obtain post transfusion cbc   - Maintain active T&S, transfuse to keep Hg>7, trend labs   - No overt S&S of bleeding, check stool occult  - check ferritin, TIBC, Haptoglobin, retic   - Hold AC  - GI consult (emailed), f/u rec     - Noted to be tachycardic, tachypneic though afebrile not hypoxic   c/f DVT, PE  in this high risk pt w/ metastatic malignancy.  LE doppler neg for DVT, CTA- chest: No main, right, left or lobar pulmonary embolism

## 2023-05-23 NOTE — CONSULT NOTE ADULT - SUBJECTIVE AND OBJECTIVE BOX
HPI:  38 yo M w/ PMHx metastatic stage 4 colon cancer presenting w/ symptomatic anemia w/o any overt bleeding.     Patient was at routine oncologist appointment to discuss oral chemotherapy, when labs showed Hg 5.5. Patient reported fatigue, dizziness, dyspnea, so referred to ED. Here, patient received 2u pRBC. Patient denies any melena/hematochezia, no abd pain, no nausea/vomiting, no antiplatelet/anticoagulation, no NSAID use.     Onc history:  Diagnosed in [place] 2021 when presented w/ [facility]. Underwent LAR w/ primary anastomosis, then had chemotherapy w/ progression of disease. Connected w/ [facility] Dr. Velarde 2022. Has been on multiple courses ofchemotherapy and radiation, now had peritoneal mets, liver mets. Most recently states he had EGD/colonoscopy in [place] for anemia which had no evidence of GI bleeding.       Allergies:  No Known Allergies      Home Medications:    Hospital Medications:  acetaminophen     Tablet .. 650 milliGRAM(s) Oral every 6 hours PRN  aluminum hydroxide/magnesium hydroxide/simethicone Suspension 30 milliLiter(s) Oral every 4 hours PRN  melatonin 3 milliGRAM(s) Oral at bedtime PRN  methadone    Tablet 5 milliGRAM(s) Oral two times a day  ondansetron Injectable 4 milliGRAM(s) IV Push every 8 hours PRN  polyethylene glycol 3350 17 Gram(s) Oral daily  senna 2 Tablet(s) Oral at bedtime      PMHX/PSHX:  Colon cancer    S/P laparoscopic-assisted sigmoidectomy        Family history:      Denies family history of colon cancer/polyps, stomach cancer/polyps, pancreatic cancer/masses, liver cancer/disease, ovarian cancer and endometrial cancer.    Social History:   Tob: Denies  EtOH: Denies  Illicit Drugs: Denies    ROS:     General:  No wt loss, fevers, chills, night sweats, fatigue  Eyes:  Good vision, no reported pain  ENT:  No sore throat, pain, runny nose, dysphagia  CV:  No pain, palpitations, hypo/hypertension  Pulm:  No dyspnea, cough, tachypnea, wheezing  GI:  see HPI  :  No pain, bleeding, incontinence, nocturia  Muscle:  No pain, weakness  Neuro:  No weakness, tingling, memory problems  Psych:  No fatigue, insomnia, mood problems, depression  Endocrine:  No polyuria, polydipsia, cold/heat intolerance  Heme:  No petechiae, ecchymosis, easy bruisability  Skin:  No rash, tattoos, scars, edema    PHYSICAL EXAM:     GENERAL:  No acute distress  HEENT:  NCAT, no scleral icterus   CHEST:  no respiratory distress  HEART:  Regular rate and rhythm  ABDOMEN:  Soft, non-tender, non-distended, normoactive bowel sounds,  no masses  EXTREMITIES: No edema  SKIN:  No rash/erythema/ecchymoses/petechiae/wounds/abscess/warm/dry  NEURO:  Alert and oriented x 3, no asterixis    Vital Signs:  Vital Signs Last 24 Hrs  T(C): 36.9 (23 May 2023 09:08), Max: 37.2 (23 May 2023 00:50)  T(F): 98.4 (23 May 2023 09:08), Max: 99 (23 May 2023 00:50)  HR: 100 (23 May 2023 09:08) (99 - 112)  BP: 131/87 (23 May 2023 09:08) (126/84 - 140/89)  BP(mean): --  RR: 21 (23 May 2023 09:08) (20 - 30)  SpO2: 98% (23 May 2023 09:08) (98% - 100%)    Parameters below as of 23 May 2023 09:08  Patient On (Oxygen Delivery Method): room air      Daily Height in cm: 190.5 (22 May 2023 18:12)    Daily     LABS:                        6.6    15.69 )-----------( 364      ( 22 May 2023 22:42 )             21.1     Mean Cell Volume: 65.9 fl (- @ 22:42)        132<L>  |  99  |  13  ----------------------------<  70  4.4   |  19<L>  |  0.64    Ca    8.4      22 May 2023 22:42    TPro  8.1  /  Alb  2.4<L>  /  TBili  2.6<H>  /  DBili  x   /  AST  126<H>  /  ALT  31  /  AlkPhos  566<H>      LIVER FUNCTIONS - ( 22 May 2023 19:38 )  Alb: 2.4 g/dL / Pro: 8.1 g/dL / ALK PHOS: 566 U/L / ALT: 31 U/L / AST: 126 U/L / GGT: x           PT/INR - ( 23 May 2023 00:46 )   PT: 23.4 sec;   INR: 2.02 ratio         PTT - ( 23 May 2023 00:46 )  PTT:30.6 sec  Urinalysis Basic - ( 22 May 2023 22:42 )    Color: Dark Yellow / Appearance: Slightly Turbid / S.019 / pH: x  Gluc: x / Ketone: Negative  / Bili: Small / Urobili: 3 mg/dL   Blood: x / Protein: 30 mg/dL / Nitrite: Negative   Leuk Esterase: Negative / RBC: 9 /hpf / WBC 2 /HPF   Sq Epi: x / Non Sq Epi: x / Bacteria: Negative      Amylase Serum--      Lipase serum56       Ammonia--                          6.6    15.69 )-----------( 364      ( 22 May 2023 22:42 )             21.1                         7.1    15.45 )-----------( Clumped    ( 22 May 2023 19:38 )             23.6                         5.5    11.31 )-----------( 250      ( 22 May 2023 16:42 )             17.6       Imaging:  CT A/P 23  FINDINGS:  CHEST:  LUNGS AND LARGE AIRWAYS: Patent central airways. Bibasilar atelectasis.  PLEURA: Small bilateral pleural effusions.  VESSELS: Suboptimal opacification of the pulmonary arterial tree. No   main, right, left or lobar pulmonary embolism.  HEART: Heart size is normal. No pericardial effusion.  MEDIASTINUM AND YULIANA: No lymphadenopathy.  CHEST WALL AND LOWER NECK: Within normal limits.    ABDOMEN AND PELVIS:  LIVER: Innumerable bilobar hepatic metastases replacing most of the liver.  BILE DUCTS: Normal caliber.  GALLBLADDER: Contracted.  SPLEEN: Within normal limits.  PANCREAS: Within normal limits.  ADRENALS: Within normal limits.  KIDNEYS/URETERS: No hydronephrosis. 3 mm nonobstructing right interpolar   calculus.    BLADDER: Within normal limits.  REPRODUCTIVE ORGANS: Prostate within normal limits.    BOWEL: No bowel obstruction. Appendix is normal. Rectosigmoid   anastomosis. Redemonstrated wall thickening of the descending colon.  PERITONEUM: Interval increase in left upper quadrant intra-abdominal   metastasis which measures 8.8 x 7.5 cm, previously 8.3 x 7.3 cm. This   causes mass effect upon the stomach. Additional implants in the left   upper quadrant, left upper pelvis and right pelvis, grossly unchanged in   size from prior. Small volume ascites,, trace on the prior. VESSELS:   Within normal limits.  RETROPERITONEUM/LYMPH NODES: No lymphadenopathy.  ABDOMINAL WALL: Redemonstrated implants in the left anterior abdominal   wall measures 4.2 x 3.9 cm, previously 4.3 x 3.2 cm.  BONES: Within normal limits.    IMPRESSION:  Suboptimal study for evaluation of pulmonary embolism. No main, right,   left or lobar pulmonary embolism.    No acute findings in the abdomen or pelvis.  Grossly stable metastatic disease involving the liver, peritoneal   implants and descending colon.    Small volume ascites, slightly increased.               HPI:  36 yo M w/ PMHx metastatic stage 4 colon cancer presenting w/ symptomatic anemia w/o any overt bleeding.     Patient was at routine oncologist appointment to discuss oral chemotherapy, when labs showed Hg 5.5. Patient reported fatigue, dizziness, dyspnea, so referred to ED. Here, patient received 2u pRBC. Patient denies any melena/hematochezia, no abd pain, no nausea/vomiting, no antiplatelet/anticoagulation, no NSAID use.     Onc history:  Diagnosed in [place] 2021 when presented w/ [facility]. Underwent LAR w/ primary anastomosis, then had chemotherapy w/ progression of disease. Connected w/ [facility] Dr. Velarde 2022. Has been on multiple courses ofchemotherapy and radiation, now had peritoneal mets, liver mets. Most recently states he had EGD/colonoscopy in [place] for anemia which had no evidence of GI bleeding (report not available)      Allergies:  No Known Allergies      Home Medications:    Hospital Medications:  acetaminophen     Tablet .. 650 milliGRAM(s) Oral every 6 hours PRN  aluminum hydroxide/magnesium hydroxide/simethicone Suspension 30 milliLiter(s) Oral every 4 hours PRN  melatonin 3 milliGRAM(s) Oral at bedtime PRN  methadone    Tablet 5 milliGRAM(s) Oral two times a day  ondansetron Injectable 4 milliGRAM(s) IV Push every 8 hours PRN  polyethylene glycol 3350 17 Gram(s) Oral daily  senna 2 Tablet(s) Oral at bedtime      PMHX/PSHX:  Colon cancer    S/P laparoscopic-assisted sigmoidectomy        Family history:      Denies family history of colon cancer/polyps, stomach cancer/polyps, pancreatic cancer/masses, liver cancer/disease, ovarian cancer and endometrial cancer.    Social History:   Tob: Denies  EtOH: Denies  Illicit Drugs: Denies    ROS:     General:  No wt loss, fevers, chills, night sweats, fatigue  Eyes:  Good vision, no reported pain  ENT:  No sore throat, pain, runny nose, dysphagia  CV:  No pain, palpitations, hypo/hypertension  Pulm:  No dyspnea, cough, tachypnea, wheezing  GI:  see HPI  :  No pain, bleeding, incontinence, nocturia  Muscle:  No pain, weakness  Neuro:  No weakness, tingling, memory problems  Psych:  No fatigue, insomnia, mood problems, depression  Endocrine:  No polyuria, polydipsia, cold/heat intolerance  Heme:  No petechiae, ecchymosis, easy bruisability  Skin:  No rash, tattoos, scars, edema    PHYSICAL EXAM:     GENERAL:  No acute distress  HEENT:  NCAT, no scleral icterus   CHEST:  no respiratory distress  HEART:  Regular rate and rhythm  ABDOMEN:  Soft, non-tender, non-distended, normoactive bowel sounds,  no masses  EXTREMITIES: No edema  SKIN:  No rash/erythema/ecchymoses/petechiae/wounds/abscess/warm/dry  NEURO:  Alert and oriented x 3, no asterixis  PSYCH: normal affect    Vital Signs:  Vital Signs Last 24 Hrs  T(C): 36.9 (23 May 2023 09:08), Max: 37.2 (23 May 2023 00:50)  T(F): 98.4 (23 May 2023 09:08), Max: 99 (23 May 2023 00:50)  HR: 100 (23 May 2023 09:08) (99 - 112)  BP: 131/87 (23 May 2023 09:08) (126/84 - 140/89)  BP(mean): --  RR: 21 (23 May 2023 09:08) (20 - 30)  SpO2: 98% (23 May 2023 09:08) (98% - 100%)    Parameters below as of 23 May 2023 09:08  Patient On (Oxygen Delivery Method): room air      Daily Height in cm: 190.5 (22 May 2023 18:12)    Daily     LABS:                        6.6    15.69 )-----------( 364      ( 22 May 2023 22:42 )             21.1     Mean Cell Volume: 65.9 fl (- @ 22:42)        132<L>  |  99  |  13  ----------------------------<  70  4.4   |  19<L>  |  0.64    Ca    8.4      22 May 2023 22:42    TPro  8.1  /  Alb  2.4<L>  /  TBili  2.6<H>  /  DBili  x   /  AST  126<H>  /  ALT  31  /  AlkPhos  566<H>      LIVER FUNCTIONS - ( 22 May 2023 19:38 )  Alb: 2.4 g/dL / Pro: 8.1 g/dL / ALK PHOS: 566 U/L / ALT: 31 U/L / AST: 126 U/L / GGT: x           PT/INR - ( 23 May 2023 00:46 )   PT: 23.4 sec;   INR: 2.02 ratio         PTT - ( 23 May 2023 00:46 )  PTT:30.6 sec  Urinalysis Basic - ( 22 May 2023 22:42 )    Color: Dark Yellow / Appearance: Slightly Turbid / S.019 / pH: x  Gluc: x / Ketone: Negative  / Bili: Small / Urobili: 3 mg/dL   Blood: x / Protein: 30 mg/dL / Nitrite: Negative   Leuk Esterase: Negative / RBC: 9 /hpf / WBC 2 /HPF   Sq Epi: x / Non Sq Epi: x / Bacteria: Negative      Amylase Serum--      Lipase serum56       Ammonia--                          6.6    15.69 )-----------( 364      ( 22 May 2023 22:42 )             21.1                         7.1    15.45 )-----------( Clumped    ( 22 May 2023 19:38 )             23.6                         5.5    11.31 )-----------( 250      ( 22 May 2023 16:42 )             17.6       Imaging:  CT A/P 23  FINDINGS:  CHEST:  LUNGS AND LARGE AIRWAYS: Patent central airways. Bibasilar atelectasis.  PLEURA: Small bilateral pleural effusions.  VESSELS: Suboptimal opacification of the pulmonary arterial tree. No   main, right, left or lobar pulmonary embolism.  HEART: Heart size is normal. No pericardial effusion.  MEDIASTINUM AND YULIANA: No lymphadenopathy.  CHEST WALL AND LOWER NECK: Within normal limits.    ABDOMEN AND PELVIS:  LIVER: Innumerable bilobar hepatic metastases replacing most of the liver.  BILE DUCTS: Normal caliber.  GALLBLADDER: Contracted.  SPLEEN: Within normal limits.  PANCREAS: Within normal limits.  ADRENALS: Within normal limits.  KIDNEYS/URETERS: No hydronephrosis. 3 mm nonobstructing right interpolar   calculus.    BLADDER: Within normal limits.  REPRODUCTIVE ORGANS: Prostate within normal limits.    BOWEL: No bowel obstruction. Appendix is normal. Rectosigmoid   anastomosis. Redemonstrated wall thickening of the descending colon.  PERITONEUM: Interval increase in left upper quadrant intra-abdominal   metastasis which measures 8.8 x 7.5 cm, previously 8.3 x 7.3 cm. This   causes mass effect upon the stomach. Additional implants in the left   upper quadrant, left upper pelvis and right pelvis, grossly unchanged in   size from prior. Small volume ascites,, trace on the prior. VESSELS:   Within normal limits.  RETROPERITONEUM/LYMPH NODES: No lymphadenopathy.  ABDOMINAL WALL: Redemonstrated implants in the left anterior abdominal   wall measures 4.2 x 3.9 cm, previously 4.3 x 3.2 cm.  BONES: Within normal limits.    IMPRESSION:  Suboptimal study for evaluation of pulmonary embolism. No main, right,   left or lobar pulmonary embolism.    No acute findings in the abdomen or pelvis.  Grossly stable metastatic disease involving the liver, peritoneal   implants and descending colon.    Small volume ascites, slightly increased.

## 2023-05-23 NOTE — H&P ADULT - NSHPPHYSICALEXAM_GEN_ALL_CORE
T(C): 37.2 (05-23-23 @ 00:50), Max: 37.2 (05-23-23 @ 00:50)  HR: 109 (05-23-23 @ 00:50) (109 - 112)  BP: 133/88 (05-23-23 @ 00:50) (133/88 - 140/89)  RR: 30 (05-23-23 @ 00:50) (20 - 30)  SpO2: 100% (05-23-23 @ 00:50) (100% - 100%)    CONSTITUTIONAL: Well groomed, no apparent distress  EYES: PERRLA and symmetric, EOMI  ENMT: MMM. Normal dentition  RESP: No respiratory distress, no use of accessory muscles; CTA b/l, no WRR  CV: +S1S2, RRR, no MRG; no peripheral edema  GI: Soft, NTND, no RGR; no palpable masses  MSK: Normal gait; No digital clubbing or cyanosis; normal pain free ROM x4 extremities   SKIN: No rashes or ulcers noted  NEURO: CN II-XII grossly intact; normal reflexes, sensation intact throughout   PSYCH: Appropriate insight/judgment; A+O x 3, mood and affect appropriate T(C): 37.2 (05-23-23 @ 00:50), Max: 37.2 (05-23-23 @ 00:50)  HR: 109 (05-23-23 @ 00:50) (109 - 112)  BP: 133/88 (05-23-23 @ 00:50) (133/88 - 140/89)  RR: 30 (05-23-23 @ 00:50) (20 - 30)  SpO2: 100% (05-23-23 @ 00:50) (100% - 100%)    CONSTITUTIONAL: tired appearing, NAD  EYES: PERRLA and symmetric, EOMI, pale conjunctiva   ENMT: MMM. Normal dentition  RESP: No respiratory distress, no use of accessory muscles; CTA b/l, no WRR  CV: +S1S2, RRR, no MRG; no peripheral edema  GI: firm distended nontender abm, no RGR   MSK: normal pain free ROM x4 extremities   SKIN: No rashes or ulcers noted  NEURO: CN II-XII grossly intact; normal reflexes, sensation intact throughout   PSYCH: Appropriate insight/judgment; A+O x 3, mood and affect appropriate

## 2023-05-23 NOTE — PATIENT PROFILE ADULT - FALL HARM RISK - HARM RISK INTERVENTIONS

## 2023-05-23 NOTE — PHYSICAL THERAPY INITIAL EVALUATION ADULT - PERTINENT HX OF CURRENT PROBLEM, REHAB EVAL
Pt is a 37y M admitted to [facility] on 5/23/23 pmh  metastatic stage 4 colon cancer presenting with symptomatic anemia.  Pt was at routine checkup with oncologist today to discuss restarting oral chemotherapy, when routine labs showed hemoglobin of 5.5.  Pt endorses fatigue, dizziness, pallor, and occasional dyspnea.  Also notes abdominal distention, and new left lower extremity edema.  Of note, pt was recently admitted for a pneumonia, at which time his oral chemotherapy (stivarya) was discontinued.  Pt still has residual productive cough of white sputum, but denies fevers, chills, N/V, dark or bloody stools, constipation, hematuria or dysuria, chest pain, syncope, change in bowel or urinary habits  Hospital course: ED: given 1L NS bolus, 2UPrbc ongoing, methadone 5mg, 5/22 2242 6.6/21.1; (-) BLE duplex, CTA chest/CT abd/pelvis: Suboptimal study for evaluation of pulmonary embolism. No main, right, left or lobar pulmonary embolism. No acute findings in the abdomen or pelvis. Grossly stable metastatic disease involving the liver, peritoneal implants and descending colon. Small volume ascites, slightly increased. CXR:Low lung volumes. Elevated right hemidiaphragm with right basilar platelike atelectasis. Onc history: Diagnosed in [place] 1/2021 when presented w/ [facility]. Underwent LAR w/ primary anastomosis, then had chemotherapy w/ progression of disease. Connected w/ [facility] Dr. Velarde 9/2022. Has been on multiple courses ofchemotherapy and radiation, now had peritoneal mets, liver mets. Most recently states he had EGD/colonoscopy in [place] for anemia which had no evidence of GI bleeding. Pt is a 37y M admitted to [facility] on 5/23/23 pmh  metastatic stage 4 colon cancer presenting with symptomatic anemia.  Pt was at routine checkup with oncologist today to discuss restarting oral chemotherapy, when routine labs showed hemoglobin of 5.5.  Pt endorses fatigue, dizziness, pallor, and occasional dyspnea.  Also notes abdominal distention, and new left lower extremity edema.  Of note, pt was recently admitted for a pneumonia, at which time his oral chemotherapy (stivarya) was discontinued.  Pt still has residual productive cough of white sputum, but denies fevers, chills, N/V, dark or bloody stools, constipation, hematuria or dysuria, chest pain, syncope, change in bowel or urinary habits  Hospital course: ED: given 1L NS bolus, 2UPrbc ongoing, methadone 5mg, 5/22 2242 6.6/21.1; (-) BLE duplex, CTA chest/CT abd/pelvis: Suboptimal study for evaluation of pulmonary embolism. No main, right, left or lobar pulmonary embolism. No acute findings in the abdomen or pelvis. Grossly stable metastatic disease involving the liver, peritoneal implants and descending colon. Small volume ascites, slightly increased. CXR:Low lung volumes. Elevated right hemidiaphragm with right basilar platelike atelectasis. Onc history: Diagnosed in [place] 1/2021 when presented w/ [facility]. Underwent LAR w/ primary anastomosis, then had chemotherapy w/ progression of disease. Connected w/ [facility] Dr. Velarde 9/2022. Has been on multiple courses ofchemotherapy and radiation, now had peritoneal mets, liver mets. Most recently states he had EGD/colonoscopy in [place] for anemia which had no evidence of GI bleeding. +capsule study currently placed at 0945 5/23/23

## 2023-05-23 NOTE — PHYSICAL THERAPY INITIAL EVALUATION ADULT - NSPTDISCHREC_GEN_A_CORE
DC home with no skilled PT needs, assist from spouse as needed, no DME needs at this time, +shower chair for decreased endurance (owns), CM mary aware./No skilled PT needs

## 2023-05-23 NOTE — H&P ADULT - ASSESSMENT
37y M pmh metastatic colon cancer presenting with symptomatic anemia being admitted for further care  37y M pmh stage 4 metastatic colon cancer presenting with symptomatic anemia being admitted for further care

## 2023-05-23 NOTE — PATIENT PROFILE ADULT - CAREGIVER NAME
Celestina Calhoun
decreased endurance/impaired balance/impaired postural control/decreased ROM/decreased strength

## 2023-05-23 NOTE — CONSULT NOTE ADULT - ASSESSMENT
Impression:  38 yo M w/ PMHx stage 4 colon ca (mets to liver, peritoneum) and LAR of sigmoid primary mass, on multiple lines of chemotherapy; now presenting w/ symtomatic anemia w/o any overt bleeding    # anemia - microcytic anemia, Hg 5.5, which is down from 8.8 one week earlier. States had recent EGD/colonoscopy in [place] (results unavailable) w/o any bleeding. Ddx includes anemia of chronic inflammation, occult blood loss, less likely chemotherapy induced given only one cell line is decreased. Can plan for capsule endoscopy to rule out small bowel bleeding    Recommendations:  - f/u heme/onc recs  - ferritin, haptoglobin, retic  - capsule endoscopy today  - obtain records on recent EGD/colonoscopy; states they were given to Dr. Velarde at [facility]  - trend CBC, transfuse to >7  - rest of care per primary team    GI will continue to follow.     All recommendations are tentative until note is attested by attending.     Gonzalo Richardson, PGY6  Gastroenterology/Hepatology Fellow  During weekdays: Available on [employer] Teams or pager:[phone] ([employer] Short Range Pager); [phone] (Long Range Pager)  Overnight/Weekend: Please page the on-call GI fellow

## 2023-05-23 NOTE — H&P ADULT - PROBLEM SELECTOR PLAN 2
Hx/o Stage IV colon cancer, s/p multiple chemo regimens, recently noted to have progression of disease   Was planned to start Regorafenib (not started yet)  - Found to be anemic on O/P Heme/Onc office, sent in for further management, see above   - Heme/Onc aware that pt  is admitted and will see pt in AM, f/u for further rec  - On Methadone 5mg BID, will c/w   - Bowel regimen      Follows Oncologist Ashok Velarde

## 2023-05-23 NOTE — PHYSICAL THERAPY INITIAL EVALUATION ADULT - GENERAL OBSERVATIONS, REHAB EVAL
Pt a/w anemia, hx stage 4 colon Ca, H&H on admission 5.5; now H&H 5/22 PM 6.6/21.1 s/p PRBC transfusion;currently +capsule study per GI placed at 0945. Pt received in ED on stretcher ([employer] 8R), +IVL, +capsule camera, A&Ox4, follows all commands.

## 2023-05-25 NOTE — DISCHARGE NOTE PROVIDER - NSDCFUSCHEDAPPT_GEN_ALL_CORE_FT
[facility]  [facility]  Scheduled Appointment: 05/30/2023     [facility] Physician [facility]  [address]  [address]  Scheduled Appointment: 05/31/2023    Meryl Kirby  [facility] Physician [facility]  [facility]  Scheduled Appointment: 06/08/2023     [facility] Physician [facility]  [address]  [address]  Scheduled Appointment: 05/31/2023    Meryl Kirby  [facility] Physician [facility]  [facility]  Scheduled Appointment: 06/08/2023    Saulo Zhou  [facility] Physician [facility]  [address]  Scheduled Appointment: 07/19/2023

## 2023-05-25 NOTE — DISCHARGE NOTE PROVIDER - PROVIDER TOKENS
PROVIDER:[TOKEN:[[id number]],FOLLOWUP:[1 week]] PROVIDER:[TOKEN:[[id number]],FOLLOWUP:[1 week]],PROVIDER:[TOKEN:[[id number]]]

## 2023-05-25 NOTE — DISCHARGE NOTE PROVIDER - NSDCFUADDAPPT_GEN_ALL_CORE_FT
APPTS ARE READY TO BE MADE: [ ] YES    Best Family or Patient Contact (if needed):    Additional Information about above appointments (if needed):    1:   2:   3:     Other comments or requests:    APPTS ARE READY TO BE MADE: [ X] YES    Best Family or Patient Contact (if needed):    Additional Information about above appointments (if needed):    1:   2:   3:     Other comments or requests:    APPTS ARE READY TO BE MADE: [ X] YES    Best Family or Patient Contact (if needed):    Additional Information about above appointments (if needed):    1:   2:   3:     Other comments or requests:   Patient was scheduled with Dr. Zhou on 07/19/2023 3:45PM at [address], [address], [place], [address].    Patient was scheduled with Dr. Edmundo Payan on 05/31/2023. He was told to arrive anytime before 12pm. This was scheduled through the provider's office and he was advised of the appointment details. APPTS ARE READY TO BE MADE: [ X] YES    Best Family or Patient Contact (if needed):    Additional Information about above appointments (if needed):    1:   2:   3:     Other comments or requests:   Patient was scheduled with Dr. Zhou on 07/19/2023 3:45PM at [address] [facility] [facility], [place], [address].    Patient was scheduled with Dr. Edmundo Payan on 05/31/2023 at [address] [place] [address]. He was told to arrive anytime before 12pm. This was scheduled through the provider's office and he was advised of the appointment details.

## 2023-05-25 NOTE — PROGRESS NOTE ADULT - ATTENDING COMMENTS
Agree with above. We are unable to obtain records from [facility]. Patient's video capsule was nondiagnostic as it was retained in stomach for the entire duration of the 13 hours. Would check x-ray again today to ensure passage of capsule.     Since we still do not have an etiology for his anemia, would plan for EGD/colonoscopy +/- endoscopically deployed capsule into small bowel tomorrow.

## 2023-05-25 NOTE — CONSULT NOTE ADULT - ASSESSMENT
# Metastatic Left sided colon carcinoma   -KRAS WT, BRAF WT, HER2-, SAMAN  - diagnosed in Jan 2021 in [place] with a mass 25cm from anal verge with path confirmed moderately differentiated adenocarcinom of the colon. S/P FOLFOX _ Linda for 8 cycles. Then FOLFORI + cetuximab x 4 cycles, with POD f peritoneal nodules. Then HIPEC with residual peritoneal nodule found to have mucinous adenocarcinoma. Next treatment with CAPIRI + cetuximab x 4 cycles. Then travelled to the  for possible clinical trial in september 2022. No available trial at [facility]. Molecular testing showed no actionable mutations. Tumor board discussion led to recommendation of Regorafenib or Lonsurf +/- Linda. He returned to [facility] and received 3 cycles of Lonsurf + Novolumab; with RT. represented in May 2023 from [facility] to re-establish care; awaiting molecular testing from liver biopsy. Pt started Regorafenib daily X 21 days per 28 day cycle with one week at 80mg before increasing to full dose 160mg. He only completed 3-4 days before being sent to ED for fever. Reg has been on hold since ED visit, with clinically worsening symptoms and increasing abdominal girth.   - CT C/A/P on admission with POD and numerous hepatic metastatic disease replacing most of liver tissue.   - will reach out to outpatient team if patient is still candidate for future DMT. No plan for inpatient treatment.     # microcytic Anemia   - s/p 2 units on early morning 5/23 --> responded appropriately   - Iron panel showing AOCD   - Hemolysis labs negative (hapto of 42)  - Unclear source of bleed --> Capsule endoscopy was non diagnostic due to slow transit in GI system.   - GI consulted  - If hemoglobin continues to trends down, patient may benefit for EGD/colonscopy while inpatient.     Mounika Manuel MD   PGY5 heme onc fellow          # Metastatic Left sided colon carcinoma   -KRAS WT, BRAF WT, HER2-, SAMAN  - diagnosed in Jan 2021 in [place] with a mass 25cm from anal verge with path confirmed moderately differentiated adenocarcinom of the colon. S/P FOLFOX _ Linda for 8 cycles. Then FOLFORI + cetuximab x 4 cycles, with POD f peritoneal nodules. Then HIPEC with residual peritoneal nodule found to have mucinous adenocarcinoma. Next treatment with CAPIRI + cetuximab x 4 cycles. Then travelled to the  for possible clinical trial in september 2022. No available trial at [facility]. Molecular testing showed no actionable mutations. Tumor board discussion led to recommendation of Regorafenib or Lonsurf +/- Linda. He returned to [facility] and received 3 cycles of Lonsurf + Novolumab; with RT. represented in May 2023 from [facility] to re-establish care; awaiting molecular testing from liver biopsy. Pt started Regorafenib daily X 21 days per 28 day cycle with one week at 80mg before increasing to full dose 160mg. He only completed 3-4 days before being sent to ED for fever. Reg has been on hold since ED visit, (off of chemotherapy for over 2 weeks) with clinically worsening symptoms and increasing abdominal girth.   - CT C/A/P on admission with POD and numerous hepatic metastatic disease replacing most of liver tissue.   - will reach out to outpatient team if patient is still candidate for future DMT. No plan for inpatient treatment.     # microcytic Anemia   - s/p 2 units on early morning 5/23 --> responded appropriately; but today downtrending again.   - Iron panel showing AOCD   - Hemolysis labs negative (hapto of 42)  - Unclear source of bleed --> Capsule endoscopy was non diagnostic due to slow transit in GI system.   - GI consulted: EGD/colonscopy planned for tomorrow.     Mounika Manuel MD   PGY5 heme onc fellow

## 2023-05-25 NOTE — DISCHARGE NOTE PROVIDER - CARE PROVIDER_API CALL
Edmundo Payan  Internal Medicine  [address] [place] [address]  Phone: [phone]  Fax: [phone]  Follow Up Time: 1 week   Edmundo Payan  Internal Medicine  [address] [place] [address]  Phone: [phone]  Fax: [phone]  Follow Up Time: 1 week    Saulo Zhou  Gastroenterology  [address] [place] [id number]  Phone: [phone]  Fax: [phone]  Follow Up Time:

## 2023-05-25 NOTE — CONSULT NOTE ADULT - SUBJECTIVE AND OBJECTIVE BOX
Hematology Consult Note    HPI:  37y M pmh  metastatic stage 4 colon cancer presenting with symptomatic anemia.  Patient was at routine checkup with oncologist today to discuss restarting oral chemotherapy, when routine labs showed hemoglobin of 5.5.  Patient endorses fatigue, dizziness, pallor, and occasional dyspnea.  Also notes abdominal distention, and new left lower extremity edema.  Of note, patient was recently admitted for a pneumonia, at which time his oral chemotherapy (stivarya) was discontinued.  Patient still has residual productive cough of white sputum, but denies fevers, chills, N/V, dark or bloody stools, constipation, hematuria or dysuria, chest pain, syncope, change in bowel or urinary habits   ED: given 1L NS bolus, 2UPrbc ongoing, methadone 5mg  (23 May 2023 03:11)    Since admission, patient was evaluated by GI. Given that he had a recent EGD/colonoscopy in [place] (results unavailable per GI team) he underwent capsule endoscopy. there was delay of GI passage of capsule and was therefore non-diagnostic. Capsule appears to have progressed per Xray overnight. Awaiting GI evaluation for possible colonoscopy/EGD during hospitalization.     PAST MEDICAL & SURGICAL HISTORY:  Colon cancer      S/P laparoscopic-assisted sigmoidectomy          FAMILY HISTORY:      MEDICATIONS  (STANDING):  methadone    Tablet 5 milliGRAM(s) Oral two times a day  polyethylene glycol 3350 17 Gram(s) Oral daily  senna 2 Tablet(s) Oral at bedtime    MEDICATIONS  (PRN):  acetaminophen     Tablet .. 650 milliGRAM(s) Oral every 6 hours PRN Temp greater or equal to 38C (100.4F), Mild Pain (1 - 3)  aluminum hydroxide/magnesium hydroxide/simethicone Suspension 30 milliLiter(s) Oral every 4 hours PRN Dyspepsia  melatonin 3 milliGRAM(s) Oral at bedtime PRN Insomnia  ondansetron Injectable 4 milliGRAM(s) IV Push every 8 hours PRN Nausea and/or Vomiting      Allergies    No Known Allergies    Intolerances        SOCIAL HISTORY: No EtOH, no tobacco        T(F): 97.8 (05-25-23 @ 04:06), Max: 98.7 (05-24-23 @ 11:12)  HR: 102 (05-25-23 @ 04:06)  BP: 128/86 (05-25-23 @ 04:06)  RR: 18 (05-25-23 @ 04:06)  SpO2: 98% (05-25-23 @ 04:06)  Wt(kg): --    GENERAL: NAD, well-developed  HEAD:  Atraumatic, Normocephalic  EYES: EOMI, PERRLA, conjunctiva and sclera clear  NECK: Supple, No JVD  CHEST/LUNG: Clear to auscultation bilaterally; No wheeze  HEART: Regular rate and rhythm; No murmurs, rubs, or gallops  ABDOMEN: Soft, Nontender, Nondistended; Bowel sounds present  EXTREMITIES:  2+ Peripheral Pulses, No clubbing, cyanosis, or edema  NEUROLOGY: non-focal  SKIN: No rashes or lesions                          8.6    15.90 )-----------( 455      ( 25 May 2023 04:45 )             28.4       05-25    133<L>  |  99  |  15  ----------------------------<  106<H>  4.6   |  20<L>  |  0.64    Ca    9.0      25 May 2023 04:45    TPro  7.4  /  Alb  2.4<L>  /  TBili  4.3<H>  /  DBili  x   /  AST  117<H>  /  ALT  31  /  AlkPhos  716<H>  05-25           Hematology Consult Note    HPI:  37y M pmh  metastatic stage 4 colon cancer presenting with symptomatic anemia.  Patient was at routine checkup with oncologist today to discuss restarting oral chemotherapy, when routine labs showed hemoglobin of 5.5.  Patient endorses fatigue, dizziness, pallor, and occasional dyspnea.  Also notes abdominal distention, and new left lower extremity edema.  Of note, patient was recently admitted for a pneumonia, at which time his oral chemotherapy (stivarya) was discontinued.  Patient still has residual productive cough of white sputum, but denies fevers, chills, N/V, dark or bloody stools, constipation, hematuria or dysuria, chest pain, syncope, change in bowel or urinary habits   ED: given 1L NS bolus, 2UPrbc ongoing, methadone 5mg  (23 May 2023 03:11)    Since admission, patient was evaluated by GI. Given that he had a recent EGD/colonoscopy in [place] (results unavailable per GI team) he underwent capsule endoscopy. there was delay of GI passage of capsule and was therefore non-diagnostic. Capsule appears to have progressed per Xray overnight. Awaiting GI evaluation for possible colonoscopy/EGD during hospitalization.     PAST MEDICAL & SURGICAL HISTORY:  Colon cancer      S/P laparoscopic-assisted sigmoidectomy          FAMILY HISTORY:      MEDICATIONS  (STANDING):  methadone    Tablet 5 milliGRAM(s) Oral two times a day  polyethylene glycol 3350 17 Gram(s) Oral daily  senna 2 Tablet(s) Oral at bedtime    MEDICATIONS  (PRN):  acetaminophen     Tablet .. 650 milliGRAM(s) Oral every 6 hours PRN Temp greater or equal to 38C (100.4F), Mild Pain (1 - 3)  aluminum hydroxide/magnesium hydroxide/simethicone Suspension 30 milliLiter(s) Oral every 4 hours PRN Dyspepsia  melatonin 3 milliGRAM(s) Oral at bedtime PRN Insomnia  ondansetron Injectable 4 milliGRAM(s) IV Push every 8 hours PRN Nausea and/or Vomiting      Allergies    No Known Allergies    Intolerances        SOCIAL HISTORY: No EtOH, no tobacco        T(F): 97.8 (05-25-23 @ 04:06), Max: 98.7 (05-24-23 @ 11:12)  HR: 102 (05-25-23 @ 04:06)  BP: 128/86 (05-25-23 @ 04:06)  RR: 18 (05-25-23 @ 04:06)  SpO2: 98% (05-25-23 @ 04:06)  Wt(kg): --    GENERAL: NAD, temporal wasting  HEAD:  Atraumatic, Normocephalic  EYES: EOMI, PERRLA, conjunctiva and sclera clear  NECK: Supple, No JVD  CHEST/LUNG: Clear to auscultation bilaterally; No wheeze  HEART: Regular rate and rhythm; No murmurs, rubs, or gallops  ABDOMEN: distended  EXTREMITIES:  2+ edema  NEUROLOGY: non-focal  SKIN: No rashes or lesions                          8.6    15.90 )-----------( 455      ( 25 May 2023 04:45 )             28.4       05-25    133<L>  |  99  |  15  ----------------------------<  106<H>  4.6   |  20<L>  |  0.64    Ca    9.0      25 May 2023 04:45    TPro  7.4  /  Alb  2.4<L>  /  TBili  4.3<H>  /  DBili  x   /  AST  117<H>  /  ALT  31  /  AlkPhos  716<H>  05-25

## 2023-05-25 NOTE — DISCHARGE NOTE PROVIDER - NSDCCPCAREPLAN_GEN_ALL_CORE_FT
PRINCIPAL DISCHARGE DIAGNOSIS  Diagnosis: Anemia  Assessment and Plan of Treatment: Anemia is when you have a low blood count of hemoglobin (HGB) and/or hematocrit (HCT), or RBC (red blood cells).If your hgb is <13 (women) or <12 (men), then you are considered to be anemic.Losing a moderate to large amount of blood cause anemia.Although, there are several different types of anemia that are not due to blood loss.You may have received a blood transfusion during your hospitalization.You will need to follow up with your healthcare provider in one week for a blood to check your count.Call for an appointment.The primary symptoms of anemia is difficulty breathing with exertion or at rest,fatigue,bounding pulses, and/or palpitations.If you are persistantly having these symptroms, you will need to seek help from your healthcare provider.        SECONDARY DISCHARGE DIAGNOSES  Diagnosis: Hyponatremia  Assessment and Plan of Treatment: You presented with hyponatremia. Your sodium levels became stable.  Follow up with your PCP in 1-2 weeks.    Diagnosis: Colon cancer  Assessment and Plan of Treatment: You presented with a history of stage IV colon cancer and already underwent multiple chemoregimens. Heme/onc team was consulted, and there were no plans for inpatient chemo. Follow up with your outpatient oncologist.     PRINCIPAL DISCHARGE DIAGNOSIS  Diagnosis: Anemia  Assessment and Plan of Treatment: Anemia is when you have a low blood count of hemoglobin (HGB) and/or hematocrit (HCT), or RBC (red blood cells).If your hgb is <13 (women) or <12 (men), then you are considered to be anemic.Losing a moderate to large amount of blood cause anemia.Although, there are several different types of anemia that are not due to blood loss.You may have received a blood transfusion during your hospitalization.You will need to follow up with your healthcare provider in one week for a blood to check your count.Call for an appointment.The primary symptoms of anemia is difficulty breathing with exertion or at rest,fatigue,bounding pulses, and/or palpitations.If you are persistantly having these symptroms, you will need to seek help from your healthcare provider.  You had an EGD and colonoscopy done on 5/26. EGD showed normal esophagus, extrinsic compression in the stomach and duodenum, correlating with the  external mass compression seen on the CT A/P. No bleeding seen in upper GI tract. Colonoscopy showed polypoid protrusion in the sigmoid colon at the site of prior anastamosis, likely source of the anemia and was biopsied. The examined portion of the ileum was normal. Follow up with your gastroenterologist in 2-3 weeks.         SECONDARY DISCHARGE DIAGNOSES  Diagnosis: Hyponatremia  Assessment and Plan of Treatment: You presented with hyponatremia. Your sodium levels became stable.  Follow up with your PCP  in 1-2 weeks.    Diagnosis: Colon cancer  Assessment and Plan of Treatment: You presented with a history of stage IV colon cancer and already underwent multiple chemoregimens. Heme/onc team was consulted, and there were no plans for inpatient chemo. Follow up with your outpatient oncologist.     PRINCIPAL DISCHARGE DIAGNOSIS  Diagnosis: Anemia  Assessment and Plan of Treatment: Anemia is when you have a low blood count of hemoglobin (HGB) and/or hematocrit (HCT), or RBC (red blood cells).If your hgb is <13 (women) or <12 (men), then you are considered to be anemic.Losing a moderate to large amount of blood cause anemia.Although, there are several different types of anemia that are not due to blood loss.You may have received a blood transfusion during your hospitalization.You will need to follow up with your healthcare provider in one week for a blood to check your count.Call for an appointment.The primary symptoms of anemia is difficulty breathing with exertion or at rest,fatigue,bounding pulses, and/or palpitations.If you are persistantly having these symptroms, you will need to seek help from your healthcare provider.  You had an EGD and colonoscopy done on 5/26. EGD showed normal esophagus. No bleeding seen in upper GI tract. Colonoscopy showed polypoid protrusion in the sigmoid colon at the site of prior anastamosis, likely source of the anemia and was biopsied. The examined portion of the ileum was normal. Follow up with your gastroenterologist in 2-3 weeks.         SECONDARY DISCHARGE DIAGNOSES  Diagnosis: Hyponatremia  Assessment and Plan of Treatment: You presented with hyponatremia. Your sodium levels became stable.  Follow up with your PCP  in 1-2 weeks.    Diagnosis: Colon cancer  Assessment and Plan of Treatment: You presented with a history of stage IV colon cancer and already underwent multiple chemoregimens. Heme/onc team was consulted, and there were no plans for inpatient chemo. Follow up with your outpatient oncologist.

## 2023-05-25 NOTE — DISCHARGE NOTE PROVIDER - HOSPITAL COURSE
37y M pmh stage 4 metastatic colon cancer presenting with symptomatic anemia being admitted for further care     Anemia in neoplastic disease  ·  P/w symptomatic anemia  (fatigue, dizziness, dyspnea) Hx significant for metastatic Colon ca  - s/p 2 unit PRBC, post tx CBC noted, h/h stable today 9.3  - EKG sinus tachy   - CT: no acute finding in abm, stable metastatic dz , small volume ascites  - Maintain active T&S, transfuse to keep Hg>7, trend labs   - iron studies reviewed poss AOCD, occult bood neg  - LE doppler neg for DVT, CTA- chest: No main, right, left or lobar pulmonary embolism  - s/p video capsule endoscopy inconclusive, repeat Xray shows capsule moved down to small intestine   - d/w GI plan for EGD and colonoscopy tomorrow, clears today, NPO midnight.    Colon cancer  ·  Hx/o Stage IV colon cancer, s/p multiple chemo regimens, recently noted to have progression of disease   Was planned to start Regorafenib (not started yet)  - Heme/Onc consulted, no plans for inpatient chemo  - On Methadone 5mg BID, will c/w   - noted to have abd distention and small volume ascites on CT abd on admission, u/s abd for ascites showing small vol ascites and liver mets   - small ascites, nonpitting b/l foot edema (Stable) hyperbilirubinemia and hypalbuminemia: all related to advanced colon CA, will try lasix 20 mg x1 today, reassess tomorrow.    Hyponatremia  - stable 131  - HypoNa chronic, iso malignancy c/b dehydration   -Urine studies neg for SIADH      Prophylactic measure  · DVT ppx: held iso significant anemia    37y M pmh stage 4 metastatic colon cancer presenting with symptomatic anemia being admitted for further care     Anemia in neoplastic disease  ·  P/w symptomatic anemia  (fatigue, dizziness, dyspnea) Hx significant for metastatic Colon ca  - s/p 2 unit PRBC, post tx CBC noted, h/h stable today 9.3  - EKG sinus tachy   - CT: no acute finding in abm, stable metastatic dz , small volume ascites  - Maintain active T&S, transfuse to keep Hg>7, trend labs   - iron studies reviewed poss AOCD, occult bood neg  - LE doppler neg for DVT, CTA- chest: No main, right, left or lobar pulmonary embolism  - s/p video capsule endoscopy inconclusive, repeat Xray shows capsule moved down to small intestine   -s/p EGD and colonoscopy on 5/26. EGD showing normal esophagus, extrinsic compression in the stomach and duodenum, correlating with the  external mass compression seen on the CT A/P. No bleeding seen in upper GI tract. Colonoscopy showing polypoid protrusion in the sigmoid colon at the site of prior anastamosis, likely source of the patient's anemia. Biopsied. Area distal to this finding was tattooed. Foreign body in the cecum. Removal was successful. The examined portion of the ileum was normal.      Colon cancer  ·  Hx/o Stage IV colon cancer, s/p multiple chemo regimens, recently noted to have progression of disease   Was planned to start Regorafenib (not started yet)  - Heme/Onc consulted, no plans for inpatient chemo  - On Methadone 5mg BID, will c/w   - noted to have abd distention and small volume ascites on CT abd on admission, u/s abd for ascites showing small vol ascites and liver mets   - small ascites, nonpitting b/l foot edema (Stable) hyperbilirubinemia and hypalbuminemia: all related to advanced colon CA, given lasix    Hyponatremia  - stable 131  - HypoNa chronic, iso malignancy c/b dehydration   -Urine studies neg for SIADH      Prophylactic measure  · DVT ppx: held iso significant anemia     Medically cleared to be discharged on 5/26/23 per attending Dr. Jeronimo. 37y M pmh stage 4 metastatic colon cancer presenting with symptomatic anemia being admitted for further care     Anemia in neoplastic disease  ·  P/w symptomatic anemia  (fatigue, dizziness, dyspnea) Hx significant for metastatic Colon ca  - s/p 2 unit PRBC, post tx CBC noted, h/h stable today 9.3  - EKG sinus tachy   - CT: no acute finding in abm, stable metastatic dz , small volume ascites  - Maintain active T&S, transfuse to keep Hg>7, trend labs   - iron studies reviewed poss AOCD, occult bood neg  - LE doppler neg for DVT, CTA- chest: No main, right, left or lobar pulmonary embolism  - s/p video capsule endoscopy inconclusive, repeat Xray shows capsule moved down to small intestine   -s/p EGD and colonoscopy on 5/26. EGD showing normal esophagus, extrinsic compression in the stomach and duodenum, correlating with the  external mass compression seen on the CT A/P. No bleeding seen in upper GI tract. Colonoscopy showing polypoid protrusion in the sigmoid colon at the site of prior anastamosis, likely source of the patient's anemia. Biopsied. Area distal to this finding was tattooed. Foreign body in the cecum. Removal was successful. The examined portion of the ileum was normal.  Patient was cleared for d/c from GI standpoint       Colon cancer  ·  Hx/o Stage IV colon cancer, s/p multiple chemo regimens, recently noted to have progression of disease   Was planned to start Regorafenib (not started yet)  - Heme/Onc consulted, no plans for inpatient chemo  - On Methadone 5mg BID, will c/w   - noted to have abd distention and small volume ascites on CT abd on admission, u/s abd for ascites showing small vol ascites and liver mets   - small ascites, nonpitting b/l foot edema (Stable) hyperbilirubinemia and hypalbuminemia: all related to advanced colon CA, s/p lasix x1 with improvement in edema     Hyponatremia  resolved   - HypoNa chronic, iso malignancy c/b dehydration   -Urine studies neg for SIADH      Patient is stable for d/c home, to f/u with Heme/onc outpt (Dr. FAYE Velarde) outpt on 5/30, and GI

## 2023-05-25 NOTE — CONSULT NOTE ADULT - ATTENDING COMMENTS
Agree with above. Pt with metastatic colon cancer on chemotherapy. No overt bleeding, but has recurrent anemia, reportedly had negative EGD/colonoscopy in [place] last month for recurrent anemia. WBC and platelet counts within normal. Will plan for video capsule endoscopy today to evaluate small bowel. Pt to try to obtain records from EGD/colonoscopy recently for review.
47/m with metastatic Left sided colon carcinoma, heavily pre treated, started Regorafenib 5 days prior to admission, a/w anemia. S/p transfusion. Plan for scope tomorrow. Hold cancer treatment for now. Wife reports pt is found to have a new mutation and may be eligible for parp/clinical trial. Rec to start therapy as outpt.

## 2023-05-26 PROBLEM — A41.9 SEPSIS: Status: ACTIVE | Noted: 2023-01-01

## 2023-05-26 NOTE — HISTORY OF PRESENT ILLNESS
[Disease: _____________________] : Disease: [unfilled] [AJCC Stage: ____] : AJCC Stage: [unfilled] [de-identified] : 37 year old male from [place], presents to [facility] to establish care for metastatic L. sided colon cancer (KRAS WT, BRAF WT, HER2-, SAMAN).\par \par Mr. Barrientos is a 38 y/o M with no PMH who initially presented to hospital in his home country of [place] in January 2021. At that time he had experienced left lower quadrant abdominal pain followed by bright red blood per rectum. He underwent a sigmoidoscopy which revealed a 25cm mass from the anal verge obstructing the lumen, path confirmed moderately differentiated adenocarcinoma of colon. He subsequently underwent LAR surgery, with primary end to end anastomosis and resection of 2 omental nodes, 18LN+. He then had 1st line FOLFOX +Bevacizumab for 8 cycles, complicated by catheter site thrombosis now on anticoagulation with Xarelto, and evidence of progression of disease. Then had 2nd line treatment with FOLFIRI + Cetuximab X 4 cycles and f/u PET scan showed interval morphological increase in size of peritoneal nodules with one having increased FDG uptake. He then underwent peritonectomy and HIPEC with residual peritoneal nodule noted to have mucinous adenoca and so treated with 4 cycles of CAPIRI + Cetuximab. Recent CT scan 7/16/2022 showed stable appearing peritoneal nodules with an interval increase of segment 5 hepatic lesion (15 mm from 7mm). Plan was for local treatment of liver lesion with resection vs RFA, however, patient did not have this done and on the recommendation of his oncologists came to the [place] for a clinical trial.\par \[facility] Tumor Board on 9/14/2022. Upon radiology review there is increase in size of liver segment from 2X 1.8cm to 3.4X 2.4cm. Additionally a second subcapsular 1cm right lobe lesion concerning for met and another developing one. Mesenteric masses also found including a left upper pelvis nodule 1.8 X1.7cm and a 1.5cm mesenteric nodule. Given these findings, there are unfortunately no available clinical trials at [facility] that are recruiting. \par \par 5/8/23: Patient initially presented fall of 2022 for oncology appointment at [facility], then decided to return to his home country of [place] for further treatment. Per records patient brings with him, he received 3 cycles of Lonsurf, 7 cycles of Nivolumab and repeat scans March 2023 with progression of disease. He also had melena with Hb drop from 12 to 7.6 with work up unrevealing. Subsequently had RT X 5 for a total of 2000cGy to peritoneal mets? from 4/13-4/19/2023. Patient had repeat colonoscopy to send tissue for NGS, however no discrete mass so a liver biopsy was performed 4/26/23 and sent for Foundation Testing, reached out to [employer] who gave me email for their international contact ([email or web address]). \par \par PMH/PSH: as above\par Social Hx: has family in [place] (older brother and mother), otherwise wife and 2 young children (son Ravi 3, daughter Jacquie 5) live in [place], works in  for oil company\par Family Hx: no known history\par Allergies: NKDA\par Medications: taking none at this time\par  [de-identified] : moderately differentiated adenocarcinoma  [de-identified] : pre

## 2023-05-26 NOTE — PROGRESS NOTE ADULT - PROBLEM SELECTOR PLAN 3
- stable 131  - HypoNa chronic, iso malignancy c/b dehydration   -  Check UA, urine osm and urine NA, serum osm
resolved   - HypoNa chronic, iso malignancy c/b dehydration
- stable 131  - HypoNa chronic, iso malignancy c/b dehydration   -  Check UA, urine osm and urine NA, serum osm

## 2023-05-26 NOTE — PROGRESS NOTE ADULT - PROBLEM SELECTOR PLAN 1
P/w symptomatic anemia  (fatigue, dizziness, dyspnea) Hx significant for metastatic Colon ca  - s/p 2 unit PRBC, post tx CBC noted, h/h stable today 9.3  - EKG sinus tachy   - CT: no acute finding in abm, stable metastatic dz , small volume ascites  - Maintain active T&S, transfuse to keep Hg>7, trend labs   - iron studies reviewed poss AOCD, occult bood neg  - LE doppler neg for DVT, CTA- chest: No main, right, left or lobar pulmonary embolism  - s/p video capsule endoscopy inconclusive, repeat Xray shows capsule moved down to small intestine   - d/w GI plan for EGD and colonoscopy tomorrow, clears today, NPO midnight
P/w symptomatic anemia  (fatigue, dizziness, dyspnea) Hx significant for metastatic Colon ca  - s/p 2 unit PRBC, post tx CBC noted, h/h stable  - EKG sinus tachy , reports baseline HR 100s   - CT: no acute finding in abm, stable metastatic dz , small volume ascites  - Maintain active T&S, transfuse to keep Hg>7, trend labs   - iron studies reviewed poss AOCD, occult bood neg  - LE doppler neg for DVT, CTA- chest: No main, right, left or lobar pulmonary embolism  - s/p video capsule endoscopy inconclusive, repeat Xray shows capsule moved down to small intestine   - d/w GI plan for EGD and colonoscopy today 5/26
P/w symptomatic anemia  (fatigue, dizziness, dyspnea) Hx significant for metastatic Colon ca  - s/p 2 unit PRBC, post tx CBC noted, h/h stable today 9.3  - EKG sinus tachy   - CT: no acute finding in abm, stable metastatic dz , small volume ascites  - Maintain active T&S, transfuse to keep Hg>7, trend labs   - iron studies reviewed poss AOCD, occult bood neg  - LE doppler neg for DVT, CTA- chest: No main, right, left or lobar pulmonary embolism  - s/p video capsule endoscopy, pending results and GI recs

## 2023-05-26 NOTE — PHYSICAL EXAM
[Restricted in physically strenuous activity but ambulatory and able to carry out work of a light or sedentary nature] : Status 1- Restricted in physically strenuous activity but ambulatory and able to carry out work of a light or sedentary nature, e.g., light house work, office work [Normal] : affect appropriate [de-identified] : conversional dyspnea

## 2023-05-26 NOTE — PROGRESS NOTE ADULT - SUBJECTIVE AND OBJECTIVE BOX
Gastroenterology/Hepatology Progress Note      Interval Events:   - overnight two abdominal xrays, demonstrate progression of capsule from stomach to small intestine     Allergies:  No Known Allergies      Hospital Medications:  acetaminophen     Tablet .. 650 milliGRAM(s) Oral every 6 hours PRN  aluminum hydroxide/magnesium hydroxide/simethicone Suspension 30 milliLiter(s) Oral every 4 hours PRN  melatonin 3 milliGRAM(s) Oral at bedtime PRN  methadone    Tablet 5 milliGRAM(s) Oral two times a day  ondansetron Injectable 4 milliGRAM(s) IV Push every 8 hours PRN  polyethylene glycol 3350 17 Gram(s) Oral daily  senna 2 Tablet(s) Oral at bedtime      ROS: 14 point ROS negative unless otherwise state in subjective    PHYSICAL EXAM:   Vital Signs:  Vital Signs Last 24 Hrs  T(C): 36.6 (25 May 2023 04:06), Max: 37.1 (24 May 2023 11:12)  T(F): 97.8 (25 May 2023 04:06), Max: 98.7 (24 May 2023 11:12)  HR: 102 (25 May 2023 04:06) (102 - 118)  BP: 128/86 (25 May 2023 04:06) (121/81 - 128/86)  BP(mean): --  RR: 18 (25 May 2023 04:06) (18 - 18)  SpO2: 98% (25 May 2023 04:06) (96% - 98%)    Parameters below as of 25 May 2023 04:06  Patient On (Oxygen Delivery Method): room air      Daily     Daily     GENERAL:  No acute distress  HEENT:  NCAT, no scleral icterus  CHEST: no resp distress  HEART:  RRR  ABDOMEN:  Soft, non-tender, non-distended, normoactive bowel sounds, no masses  EXTREMITIES:  No cyanosis, clubbing, or edema  SKIN:  No rash/erythema/ecchymoses/petechiae/wounds/abscess/warm/dry  NEURO:  Alert and oriented x 3, no asterixis, no tremor    LABS:                        8.6    15.90 )-----------( 455      ( 25 May 2023 04:45 )             28.4     Mean Cell Volume: 71.7 fl (05-23 @ 04:45)        133<L>  |  99  |  15  ----------------------------<  106<H>  4.6   |  20<L>  |  0.64    Ca    9.0      25 May 2023 04:45    TPro  7.4  /  Alb  2.4<L>  /  TBili  4.3<H>  /  DBili  x   /  AST  117<H>  /  ALT  31  /  AlkPhos  716<H>  05-25    LIVER FUNCTIONS - ( 25 May 2023 04:45 )  Alb: 2.4 g/dL / Pro: 7.4 g/dL / ALK PHOS: 716 U/L / ALT: 31 U/L / AST: 117 U/L / GGT: x           PT/INR - ( 23 May 2023 11:01 )   PT: 23.2 sec;   INR: 1.99 ratio           Urinalysis Basic - ( 24 May 2023 13:40 )    Color: Dark Yellow / Appearance: Slightly Turbid / S.031 / pH: x  Gluc: x / Ketone: Negative  / Bili: Large / Urobili: >8mg/dL   Blood: x / Protein: 100 mg/dl / Nitrite: Negative   Leuk Esterase: Negative / RBC: 4 /hpf / WBC 6 /HPF   Sq Epi: x / Non Sq Epi: x / Bacteria: Negative            Imaging:          
Patient is a 37y old  Male who presents with a chief complaint of abnormal lab, symptomatic anemia (23 May 2023 09:21)      SUBJECTIVE / OVERNIGHT EVENTS: Patient seen and examined at bedside. States that he feels ok, complains of abd pain. States urine is dark today. No acute events overnight     ROS:  All other review of systems negative    Allergies    No Known Allergies    Intolerances        MEDICATIONS  (STANDING):  methadone    Tablet 5 milliGRAM(s) Oral two times a day  polyethylene glycol 3350 17 Gram(s) Oral daily  senna 2 Tablet(s) Oral at bedtime    MEDICATIONS  (PRN):  acetaminophen     Tablet .. 650 milliGRAM(s) Oral every 6 hours PRN Temp greater or equal to 38C (100.4F), Mild Pain (1 - 3)  aluminum hydroxide/magnesium hydroxide/simethicone Suspension 30 milliLiter(s) Oral every 4 hours PRN Dyspepsia  melatonin 3 milliGRAM(s) Oral at bedtime PRN Insomnia  ondansetron Injectable 4 milliGRAM(s) IV Push every 8 hours PRN Nausea and/or Vomiting      Vital Signs Last 24 Hrs  T(C): 37.1 (24 May 2023 11:12), Max: 37.1 (24 May 2023 11:12)  T(F): 98.7 (24 May 2023 11:12), Max: 98.7 (24 May 2023 11:12)  HR: 118 (24 May 2023 11:12) (73 - 118)  BP: 124/83 (24 May 2023 11:12) (117/82 - 156/75)  BP(mean): --  RR: 18 (24 May 2023 11:12) (18 - 20)  SpO2: 96% (24 May 2023 11:12) (96% - 100%)    Parameters below as of 24 May 2023 11:12  Patient On (Oxygen Delivery Method): room air      CAPILLARY BLOOD GLUCOSE        I&O's Summary    23 May 2023 07:01  -  24 May 2023 07:00  --------------------------------------------------------  IN: 120 mL / OUT: 0 mL / NET: 120 mL    24 May 2023 07:01  -  24 May 2023 12:04  --------------------------------------------------------  IN: 240 mL / OUT: 0 mL / NET: 240 mL        PHYSICAL EXAM:  GENERAL: NAD, well-developed  HEAD:  Atraumatic, Normocephalic  EYES: EOMI, PERRLA, scleral icterus   NECK: Supple, No JVD  CHEST/LUNG: Clear to auscultation bilaterally; No wheeze  HEART: Regular rate and rhythm; No murmurs, rubs, or gallops  ABDOMEN: Soft, Nontender, + distended; Bowel sounds present  EXTREMITIES:  2+ Peripheral Pulses, non-pitting b/l foot edema   NEUROLOGY: AAOx3, non-focal  PSYCH: calm  SKIN: No rashes or lesions    LABS:                        9.3    15.73 )-----------( 454      ( 24 May 2023 09:36 )             30.4     05-24    131<L>  |  97  |  15  ----------------------------<  94  4.9   |  19<L>  |  0.63    Ca    9.3      24 May 2023 09:36    TPro  7.7  /  Alb  2.6<L>  /  TBili  4.8<H>  /  DBili  x   /  AST  117<H>  /  ALT  30  /  AlkPhos  631<H>  05-23    PT/INR - ( 23 May 2023 11:01 )   PT: 23.2 sec;   INR: 1.99 ratio         PTT - ( 23 May 2023 00:46 )  PTT:30.6 sec      Urinalysis Basic - ( 22 May 2023 22:42 )    Color: Dark Yellow / Appearance: Slightly Turbid / S.019 / pH: x  Gluc: x / Ketone: Negative  / Bili: Small / Urobili: 3 mg/dL   Blood: x / Protein: 30 mg/dL / Nitrite: Negative   Leuk Esterase: Negative / RBC: 9 /hpf / WBC 2 /HPF   Sq Epi: x / Non Sq Epi: x / Bacteria: Negative        RADIOLOGY & ADDITIONAL TESTS:    Care Discussed with Consultants/Other Providers: Medicine ACP     Case Discussed with wife at bedside 
Patient is a 37y old  Male who presents with a chief complaint of abnormal lab, symptomatic anemia (25 May 2023 14:17)      SUBJECTIVE / OVERNIGHT EVENTS: Patient seen and examined at bedside. States that he feels better today, abd distention has improved. No acute events overnight     ROS:  All other review of systems negative    Allergies    No Known Allergies    Intolerances        MEDICATIONS  (STANDING):  HYDROmorphone  Injectable 0.2 milliGRAM(s) IV Push once  methadone    Tablet 5 milliGRAM(s) Oral two times a day  polyethylene glycol 3350 17 Gram(s) Oral daily  senna 2 Tablet(s) Oral at bedtime  sodium chloride 0.9%. 500 milliLiter(s) (30 mL/Hr) IV Continuous <Continuous>    MEDICATIONS  (PRN):  acetaminophen     Tablet .. 650 milliGRAM(s) Oral every 6 hours PRN Temp greater or equal to 38C (100.4F), Mild Pain (1 - 3)  aluminum hydroxide/magnesium hydroxide/simethicone Suspension 30 milliLiter(s) Oral every 4 hours PRN Dyspepsia  melatonin 3 milliGRAM(s) Oral at bedtime PRN Insomnia  ondansetron Injectable 4 milliGRAM(s) IV Push every 8 hours PRN Nausea and/or Vomiting      Vital Signs Last 24 Hrs  T(C): 36.3 (26 May 2023 13:00), Max: 36.8 (25 May 2023 23:31)  T(F): 97.4 (26 May 2023 12:47), Max: 98.3 (25 May 2023 23:31)  HR: 116 (26 May 2023 13:00) (100 - 116)  BP: 127/84 (26 May 2023 13:00) (114/75 - 127/84)  BP(mean): --  RR: 20 (26 May 2023 13:00) (18 - 20)  SpO2: 100% (26 May 2023 13:00) (97% - 100%)    Parameters below as of 26 May 2023 12:47  Patient On (Oxygen Delivery Method): room air      CAPILLARY BLOOD GLUCOSE      POCT Blood Glucose.: 83 mg/dL (26 May 2023 12:31)  POCT Blood Glucose.: 86 mg/dL (26 May 2023 06:37)    I&O's Summary    25 May 2023 07:01  -  26 May 2023 07:00  --------------------------------------------------------  IN: 650 mL / OUT: 400 mL / NET: 250 mL        PHYSICAL EXAM:  GENERAL: NAD, well-developed  HEAD:  Atraumatic, Normocephalic  EYES: EOMI, PERRLA, conjunctiva + scleral icterus   NECK: Supple, No JVD  CHEST/LUNG: Clear to auscultation bilaterally; No wheeze  HEART: Regular rate and rhythm; No murmurs, rubs, or gallops  ABDOMEN: Soft, Nontender, Nondistended; Bowel sounds present  EXTREMITIES:  2+ Peripheral Pulses, No clubbing, cyanosis, or edema  NEUROLOGY: AAOx3, non-focal    LABS:                        8.6    17.27 )-----------( 504      ( 26 May 2023 07:11 )             28.6         135  |  99  |  16  ----------------------------<  76  4.4   |  19<L>  |  0.72    Ca    9.2      26 May 2023 07:10    TPro  7.6  /  Alb  2.7<L>  /  TBili  4.9<H>  /  DBili  x   /  AST  141<H>  /  ALT  36  /  AlkPhos  902<H>            Urinalysis Basic - ( 24 May 2023 13:40 )    Color: Dark Yellow / Appearance: Slightly Turbid / S.031 / pH: x  Gluc: x / Ketone: Negative  / Bili: Large / Urobili: >8mg/dL   Blood: x / Protein: 100 mg/dl / Nitrite: Negative   Leuk Esterase: Negative / RBC: 4 /hpf / WBC 6 /HPF   Sq Epi: x / Non Sq Epi: x / Bacteria: Negative        RADIOLOGY & ADDITIONAL TESTS:    Case Discussed with wife at bedside 
Patient is a 37y old  Male who presents with a chief complaint of abnormal lab, symptomatic anemia (25 May 2023 09:28)      SUBJECTIVE / OVERNIGHT EVENTS: Patient seen and examined at bedside. States that his abd feels more distended today ,b/l foot swelling is mildly improved. passing gas and stool. ABd pain well controlled     ROS:  All other review of systems negative    Allergies    No Known Allergies    Intolerances        MEDICATIONS  (STANDING):  bisacodyl 10 milliGRAM(s) Oral at bedtime  furosemide    Tablet 20 milliGRAM(s) Oral once  methadone    Tablet 5 milliGRAM(s) Oral two times a day  polyethylene glycol 3350 17 Gram(s) Oral daily  polyethylene glycol/electrolyte Solution. 4000 milliLiter(s) Oral once  senna 2 Tablet(s) Oral at bedtime    MEDICATIONS  (PRN):  acetaminophen     Tablet .. 650 milliGRAM(s) Oral every 6 hours PRN Temp greater or equal to 38C (100.4F), Mild Pain (1 - 3)  aluminum hydroxide/magnesium hydroxide/simethicone Suspension 30 milliLiter(s) Oral every 4 hours PRN Dyspepsia  melatonin 3 milliGRAM(s) Oral at bedtime PRN Insomnia  ondansetron Injectable 4 milliGRAM(s) IV Push every 8 hours PRN Nausea and/or Vomiting      Vital Signs Last 24 Hrs  T(C): 36.7 (25 May 2023 10:35), Max: 37.1 (24 May 2023 11:12)  T(F): 98 (25 May 2023 10:35), Max: 98.7 (24 May 2023 11:12)  HR: 119 (25 May 2023 10:35) (102 - 119)  BP: 116/74 (25 May 2023 10:35) (116/74 - 128/86)  BP(mean): --  RR: 18 (25 May 2023 10:35) (18 - 18)  SpO2: 97% (25 May 2023 10:35) (96% - 98%)    Parameters below as of 25 May 2023 10:35  Patient On (Oxygen Delivery Method): room air      CAPILLARY BLOOD GLUCOSE      POCT Blood Glucose.: 96 mg/dL (25 May 2023 06:24)  POCT Blood Glucose.: 124 mg/dL (25 May 2023 00:55)    I&O's Summary    24 May 2023 07:01  -  25 May 2023 07:00  --------------------------------------------------------  IN: 840 mL / OUT: 0 mL / NET: 840 mL        PHYSICAL EXAM:  GENERAL: NAD, well-developed  HEAD:  Atraumatic, Normocephalic  EYES: EOMI, PERRLA, conjunctiva and + sclear icterus   NECK: Supple, No JVD  CHEST/LUNG: Clear to auscultation bilaterally; No wheeze  HEART: Regular rate and rhythm; No murmurs, rubs, or gallops  ABDOMEN: Soft, Nontender, + mild distended Bowel sounds present  EXTREMITIES:  2+ Peripheral Pulses, No clubbing, cyanosis, or edema  NEUROLOGY: AAOx3, non-focal  PSYCH: calm  SKIN: No rashes or lesions    LABS:                        8.6    15.90 )-----------( 455      ( 25 May 2023 04:45 )             28.4     05-25    133<L>  |  99  |  15  ----------------------------<  106<H>  4.6   |  20<L>  |  0.64    Ca    9.0      25 May 2023 04:45    TPro  7.4  /  Alb  2.4<L>  /  TBili  4.3<H>  /  DBili  x   /  AST  117<H>  /  ALT  31  /  AlkPhos  716<H>  05-25    PT/INR - ( 23 May 2023 11:01 )   PT: 23.2 sec;   INR: 1.99 ratio               Urinalysis Basic - ( 24 May 2023 13:40 )    Color: Dark Yellow / Appearance: Slightly Turbid / S.031 / pH: x  Gluc: x / Ketone: Negative  / Bili: Large / Urobili: >8mg/dL   Blood: x / Protein: 100 mg/dl / Nitrite: Negative   Leuk Esterase: Negative / RBC: 4 /hpf / WBC 6 /HPF   Sq Epi: x / Non Sq Epi: x / Bacteria: Negative        RADIOLOGY & ADDITIONAL TESTS:    Care Discussed with Consultants/Other Providers: GI     Case Discussed with wife at bedside

## 2023-05-26 NOTE — PROGRESS NOTE ADULT - PROBLEM SELECTOR PLAN 2
Hx/o Stage IV colon cancer, s/p multiple chemo regimens, recently noted to have progression of disease   Was planned to start Regorafenib (not started yet)  - Heme/Onc consulted, no plans for inpatient chemo  - On Methadone 5mg BID, will c/w   - Bowel regimen    - noted to have abd distention and small volume ascites on CT abd on admission, check u/s abd for ascites   - small ascites, nonpitting b/l foot edema (Stable) hyperbilirubinemia and hypalbuminemia: all related to advanced colon CA, will try lasix 20 mg x1 today, reassess tomorrow
Hx/o Stage IV colon cancer, s/p multiple chemo regimens, recently noted to have progression of disease   Was planned to start Regorafenib (not started yet)  - Heme/Onc consulted, no plans for inpatient chemo  - On Methadone 5mg BID, will c/w   - Bowel regimen    - noted to have abd distention and small volume ascites on CT abd on admission, check u/s abd for ascites   - small ascites, nonpitting b/l foot edema (Stable) hyperbilirubinemia and hypalbuminemia, LE edema improved with lasix 20 mg x1
Hx/o Stage IV colon cancer, s/p multiple chemo regimens, recently noted to have progression of disease   Was planned to start Regorafenib (not started yet)  - Heme/Onc consulted 5/23, pending rec (outpt oncologist Ashok Velarde)  - On Methadone 5mg BID, will c/w   - Bowel regimen    - noted to have abd distention and small volume ascites on CT abd on admission, check u/s abd for ascites   - bili 4.8 yesterday 5/23, f/u LFts today (pending) known meds to liver, u/s abd above, reports dark urine likely hyperbilirubinemia will check UA

## 2023-05-26 NOTE — PRE PROCEDURE NOTE - PRE PROCEDURE EVALUATION
Attending Physician:   Dr Zhou                        Procedure: EGD/colonoscopy    Indication for Procedure: anemia, hx of metastatic  colon cancer   ________________________________________________________  PAST MEDICAL & SURGICAL HISTORY:  Colon cancer      S/P laparoscopic-assisted sigmoidectomy        ALLERGIES:  No Known Allergies    HOME MEDICATIONS:  methadone 5 mg oral tablet: 1 orally 2 times a day    AICD/PPM: [ ] yes   [x ] no    PERTINENT LAB DATA:                        8.6    17.27 )-----------( 504      ( 26 May 2023 07:11 )             28.6     05-26    135  |  99  |  16  ----------------------------<  76  4.4   |  19<L>  |  0.72    Ca    9.2      26 May 2023 07:10    TPro  7.6  /  Alb  2.7<L>  /  TBili  4.9<H>  /  DBili  x   /  AST  141<H>  /  ALT  36  /  AlkPhos  902<H>  05-26                PHYSICAL EXAMINATION:    T(C): 36.7  HR: 105  BP: 125/84  RR: 18  SpO2: 98%    Constitutional: NAD    Neck:  No JVD  Respiratory: no respiratory distress   Cardiovascular: RRR  Extremities: No peripheral edema  Neurological: A/O x 3, no focal deficits        COMMENTS:    The patient is a suitable candidate for the planned procedure unless box checked [ ]  No, explain:

## 2023-05-26 NOTE — REVIEW OF SYSTEMS
[Fever] : fever [Chills] : chills [Night Sweats] : night sweats [Fatigue] : fatigue [Recent Change In Weight] : ~T recent weight change [Shortness Of Breath] : shortness of breath [SOB on Exertion] : shortness of breath during exertion [Abdominal Pain] : abdominal pain [Diarrhea: Grade 0] : Diarrhea: Grade 0 [Negative] : Allergic/Immunologic

## 2023-05-26 NOTE — DISCHARGE NOTE NURSING/CASE MANAGEMENT/SOCIAL WORK - PATIENT PORTAL LINK FT
You can access the FollowMyHealth Patient Portal offered by [facility] by registering at the following website: [email or web address]. By joining [employer]’s FollowMyHealth portal, you will also be able to view your health information using other applications (apps) compatible with our system.

## 2023-05-26 NOTE — DISCHARGE NOTE NURSING/CASE MANAGEMENT/SOCIAL WORK - NSDCFUADDAPPT_GEN_ALL_CORE_FT
APPTS ARE READY TO BE MADE: [ X] YES    Best Family or Patient Contact (if needed):    Additional Information about above appointments (if needed):    1:   2:   3:     Other comments or requests:

## 2023-05-26 NOTE — PROGRESS NOTE ADULT - NSPROGADDITIONALINFOA_GEN_ALL_CORE
d/w Medicine ACP Yessica
time spent reviewing prior charts, meds, discussing plan with patient= 55 min     d/w Medicine ACP Sahra
time spent reviewing prior charts, meds, discussing plan with patient= 55 min    d/w Medicine ACP Sahra

## 2023-05-26 NOTE — PROGRESS NOTE ADULT - PROBLEM SELECTOR PLAN 4
- DVT ppx: hold iso significant anemia       Dispo: Patient states he would like to go home today, ok with late discharge, states he misses his kids. He understand d/c is dependent on EGD/ colonoscopy results. anticipate late d/c home today if ok with GI.
- DVT ppx: hold iso significant anemia   - Diet: regular diet    - PT consult pending
- DVT ppx: hold iso significant anemia   - Diet: clears, NPO tonight

## 2023-05-26 NOTE — PROGRESS NOTE ADULT - REASON FOR ADMISSION
abnormal lab, symptomatic anemia

## 2023-05-30 PROBLEM — C18.9 MALIGNANT NEOPLASM OF COLON, UNSPECIFIED: Chronic | Status: ACTIVE | Noted: 2023-01-01

## 2023-05-30 NOTE — ASSESSMENT
[FreeTextEntry1] : 37 year old male from [place], presents to [facility] to establish care for metastatic L. sided colon cancer (KRAS WT, BRAF WT, HER2-, SAMAN). s/p 1st line FOLFOX + Linda X 8 cycles, s/p 2nd line FOLOFIRI + Cetuximab X 4 cycles then POD s/p peritonectomy and HIPEC then CAPIRI + Cetuximab X 4 cycles. \par Recent restaging scans 7/2022 with stable appearing peritoneal nodules and increase in size of segment 5 hepatic lesion (15 mm). Recommended by oncologists in [place].\par \par #Metastatic Colon Cancer with Progression of Disease \par - patient's case discussed at GI Tumor Board on 9/14/2022 and radiology images reviewed and compared to his prior ones from [facility] that were done in July 2022. Upon radiology review there is increase in size of liver segment from 2X 1.8cm to 3.4X 2.4cm. Additionally a second subcapsular 1cm right lobe lesion conerning for met and another developing one. Mesenteric masses also found including a left upper pelvis nodule 1.8 X1.7cm and a 1.5cm mesenteric nodule.  Given these findings, there are unfortunately no available clinical trials at [facility] that are recruiting.  Additionally searched on clinicaltrials.gov and was not able to find any phase III studies that are accruing patients. Patient requested a referral to [facility] which was previously placed.\par \par In regards to next line of therapy, patient has progressed on FOLFOX, FOLFIRI with no response to Cetuximab despite KRAS WT status (this was confirmed with his primary oncologist). Have sent liquid biopsy for molecular testing but unfortunately no actionable mutations found.  In the meantime discussed subsequent line of treatment with Regorafenib or Lonsurf +/- Bevacizumab per standard of care treatment options via NCCN. Patient elected Regorafenib. Side effects including but not limited to hypertension, fatigue, diarrhea, nausea/vomiting were reviewed and consent signed.  Will begin with Regorafenib 80mg daily X 1 week and if tolerating will increase to full dose 160mg daily. Plan will be for Regorafenib daily X 21 days per every 28 day cycle.\par \par Communicated plan with patient's primary oncologist Dr. Jeronimo and Dr. Shafquat in [place]. \par \par Patient then decided to return to [facility] and receive treatment there. Per records he received 3 cycles of Lonsurf, 7 cycles of Nivolumab, had progression of disease in the peritoneum, received RT X 5 for toatl of 2000 cGy 4/13-4/19/2023 and had a liver biopsy sent for [facility] testing. \par \par Patient now presents 5/8/2023 to re-establish care. Have reached out to [facility] regards the liver tissue sent for testing from [facility] and they referred me to [email or web address] to email as they handle international reports. Emailed them on 5/8/23 and waiting for response. If there are no actionable mutations will plan for Regorafenib daily X 21 days per 28 day cycle with one week at 80mg before increasing to full dose 160mg.\par - 5/8/23 CEA rising to 949 (from 36.6 9/8/22) \par - 5/2023 CT C/A/P with progression of disease \par \par #Hyperbilirubinemia, elevated INR, ascites\par - likely liver failure from metastatic disease\par - will order US abdomen incase there is anything reversible\par - can also consider paracentesis if large volume ascites found\par \par #Symptomatic Anemia\par - Hb 8.8 today, will continue to monitor\par - recently had EGD/colonosocpy 5/26: "Polypoid protrusion in the sigmoid colon at the site of prior anastamosis, likely source of the patient's anemia"\par \par #Supportive Care\par - for pain: after [place] I-[place] have prescribed Methadone 5mg BID. Will add breakthrough Oxycodone 5mg PRN if needed, at this time patient declines. Additionally reviewed role of palliative care services for both pain and supportive care and patient is interested. Patient has been referred to Dr. Mcmillan and evaluated by her on 5/15/23\par - today 5/30/23 I initiated goals of care with patient and his wife. They understand that cancer directed therapy at this time would be of more harm than benefit. Patient remains hopeful and uses boris to get through this time. Will continue to discuss \par - for constipation: have prescribed Miralax, previously used Lactulose and found himself to be bloated. Still constipated so will add milk of magnesia\par \par RTC next week for continuing symptom management and goals of care discussions \par

## 2023-05-30 NOTE — HISTORY OF PRESENT ILLNESS
[Disease: _____________________] : Disease: [unfilled] [de-identified] : 37 year old male from [place], presents to [facility] to establish care for metastatic L. sided colon cancer (KRAS WT, BRAF WT, HER2-, SAMAN).\par \par Mr. Barrientos is a 36 y/o M with no PMH who initially presented to hospital in his home country of [place] in January 2021.  At that time he had experienced left lower quadrant abdominal pain followed by bright red blood per rectum. He underwent a sigmoidoscopy which revealed a 25cm mass from the anal verge obstructing the lumen, path confirmed moderately differentiated adenocarcinoma of colon. He subsequently underwent LAR surgery, with primary end to end anstamosis and resection of 2 omental nodes, 18LN+. He then had 1st line FOLFOX +Bevacizumab for 8 cycles, complicated by catheter site thrombosis now on anticoagulation with Xarelto, and evidence of progression of disease. Then had 2nd line treatment with FOLFIRI + Cetuximab X 4 cycles and f/u PET scan showed interval morphological increase in size of peritoneal nodules with one having increased FDG uptake. He then underwent peritonectomy and HIPEC with residual peritoneal nodule noted to have mucinous adenoca and so treated with 4 cycles of CAPIRI + Cetuximab. Recent CT scan 7/16/2022 showed stable appearing periotoneal nodules with an interval increase of segment 5 hepatic lesion (15 mm from 7mm). Plan was for local treatment of liver lesion with resection vs RFA, however, patient did not have this done and on the recommendation of his onc[place] the [place] for a clinical trial.\par \[facility] GI Tumor Board on 9/14/2022. Upon radiology review there is increase in size of liver segment from 2X 1.8cm to 3.4X 2.4cm. Additionally a second subcapsular 1cm right lobe lesion conerning for met and another developing one. Mesenteric masses also found including a left upper pelvis nodule 1.8 X1.7cm and a 1.5cm mesenteric nodule.  Given these findings, there are unfortunately no available clinical trials at [facility] that are recruiting. \par \par 5/8/23: Patient initially presented fall of 2022 for oncology appointment at [facility], then decided to return to his home country of [place] for further treatment. Per records patient brings with him, he received 3 cycles of Lonsurf, 7 cycles of Nivolumab and repeat scans March 2023 with progression of disease. He also had melena with Hb drop from 12 to 7.6 with work up unrevealing. Subsequently had RT X 5 for a total of 2000cGy to peritoneal mets? from 4/13-4/19/2023. Patient had repeat colonoscopy to send tissue for NGS, however no discrete mass so a liver biopsy was performed 4/26/23 and sent for [facility] Testing, reached out to [employer] who gave me email for their international contact ([email or web address]). \par \par 5/12/23: Patient started Regorafenib 80mg X 2 days\par 5/15/23: Patient was evaluated by palliative care Dr. Mcmillan and oncology Dr. Whitley, found to be tachycardic, dyspneic and febrile. Sent to ER and discharged home on PO antibiotics without clear source of infection.\par 5/22/23: Evaluated in clinic for worsening abdominal distension and lower extremity edema R >L. Sent to hospital for labs notable for Hb of 5.5\par 5/22-5/26/23: admitted to hospital. CTA chest without PE, lower ext dopplers without DVT. s/p EGD/colonoscopy 5/26/23: - Polypoid protrusion in the sigmoid colon at the site of prior anastamosis, likely source of the patient's anemia.\par \par PMH/PSH: as above\par Social Hx: has family in [place] (older brother and mother), otherwise wife and 2 young children (son Ravi 3, daughter Jacquie 5) live in [place], works in  for oil company\par Family Hx: no known history\par Allergies: NKDA\par Medications: taking none at this time\par  [de-identified] : CEA on 7/2022= 20.2; CEA on 5/8/23 = 949.0 [Treatment Protocol] : Treatment Protocol [Therapy: ___] : Therapy: [unfilled] [Cycle: ___] : Cycle: [unfilled] [Day: ___] : Day: [unfilled] [de-identified] : Mr. Barrientos presents today with his wife Ameena. In the last 2 weeks he has had significant decline in his clinical condition, appears in wheelchair today, appears chronically ill with scleral icterus ascites and lower extremity edema. He is complaining of constipation despite use of laxatives, abdominal fullness and states pain is controlled with Methadone.  [ECOG Performance Status: 0 - Fully active, able to carry on all pre-disease performance without restriction] : Performance Status: 0 - Fully active, able to carry on all pre-disease performance without restriction

## 2023-05-30 NOTE — CHART NOTE - NSCHARTNOTEFT_GEN_A_CORE
37 M w/ Stage IV colon cancer, s/p multiple chemo regimens, recently noted to have progression of disease and was planned to start Regorafenib (not started yet). Pt was sent to the ER last week with fevers to 104.7 without a clear etiology. Today presented for hydration and f/u. CBC notable for Hb 5.5. Transferred to [facility] ER for further eval.     Recommendation   - transfuse 2 units PRBCs   - admit for further w/u  - repeat scans to eval for bleed  - GI Consult  - house onc will see in am
Discussed EGD/colonoscopy findings with GI fellow Dr. Rosa Allan. Patient is cleared to be discharged from GI standpoint as no further interventions planned as inpt per Dr. Allan.
GI following for anemia.     Discussed with patient. Will plan to perform EGD/colonoscopy +/- capsule endoscopy tomorrow.     Instructions for colonoscopy  - clear liquid diet today, NPO at midnight  - please ensure golytely is completed (to be ordered by GI fellow)  - please ensure patient drinks entire prep  - please ensure morning labs are drawn at 2am, electrolytes repleted as necessary, and Hg>7  - rest of care per primary team    GI will continue to follow.     All recommendations are tentative until note is attested by attending.     Gonzalo Richardson, PGY6  Gastroenterology/Hepatology Fellow  During weekdays: Available on [employer] Teams or pager:[phone] ([place] Short Range Pager); [phone] (Long Range Pager)  Overnight/Weekend: Please page the on-call GI fellow
GI following for anemia. Patient swallowed capsule endoscopy (see results for full report). Went into small bowel briefly and then refluxed back to stomach. Nondiagnostic study. Unable to determine if capsule endoscopy advanced back into small bowel, unable to assess for bleeding.     Discussed with results patient and wife at bedside.     Plan for abdominal Xray now to determine location of capsule endoscopy. If still in proximal GI tract, plan for EGD tomorrow morning    Please make NPO at midnight    Gonzalo Richardson, PGY6  GI Fellow
Patient requested a callback on 05/30 3pm.
Radiology w/ preliminary result of abdominal x-ray done 5/24 showing capsule in upper abdomen. Repeat abdominal x-ray ordered, EGD planned for 5/25 AM. Pt hemodynamically stable at this time w/o any complaints. Will monitor pt overnight. Will relay to day team, attending to follow.     Ga Singh PA-C  Department of Medicine
acute on chronic blood loss anemia due to stage IV colon ca- actively on chemotherapy  - s/p 2 units of prbc, repeat cbc pending  - plan for capsule endoscopy today, GI following; NPO  - hem/onc consult pending    Alea Ferrer D.O.  available on MS teams
Risks of video capsule endoscopy explained, including risk of retained capsule, potentially requiring surgery for removal.  Patient understands and agrees to risks.    Capsule ID: [id number]    Capsule swallowed at:9:45am    NPO now.  Resume Clear liquid diet at:  11:45am  Resume regular consistency diet at: 1:45pm    Equipment can be removed by nursing staff at: 9:45pm    GI fellow will retrieve equipment in the morning.    NO MRI UNTIL CAPSULE CLEARANCE CONFIRMED. CAPSULE IS NOT MRI COMPATIBLE.

## 2023-06-01 NOTE — ED ADULT NURSE NOTE - SUICIDE SCREENING QUESTION 1
No

## 2023-06-08 PROBLEM — C18.9: Status: ACTIVE | Noted: 2022-01-01

## 2023-06-08 PROBLEM — G89.3 CANCER-RELATED PAIN: Status: ACTIVE | Noted: 2023-01-01

## 2023-06-08 PROBLEM — Z51.5 ENCOUNTER FOR PALLIATIVE CARE: Status: ACTIVE | Noted: 2023-01-01

## 2023-06-08 NOTE — REASON FOR VISIT
[Follow-Up Visit] : a follow-up [Family Member] : family member [FreeTextEntry2] : Metastatic Colon Ca

## 2023-06-08 NOTE — ASSESSMENT
[FreeTextEntry1] : 37 year old male from [place], presents to [facility] to establish care for metastatic L. sided colon cancer (KRAS WT, BRAF WT, HER2-, SAMAN). s/p 1st line FOLFOX + Linda X 8 cycles, s/p 2nd line FOLOFIRI + Cetuximab X 4 cycles then POD s/p peritonectomy and HIPEC then CAPIRI + Cetuximab X 4 cycles. \par Recent restaging scans 7/2022 with stable appearing peritoneal nodules and increase in size of segment 5 hepatic lesion (15 mm). Recommended by oncologists in [place]. Now with progression of disease, liver failure and significant decline in performance status and clinical conditoin guarded.\par \par #Goals of Care\par - goals of care discussion had with patient and his brother. Unfortunately he is no longer a candidate for any cancer directed therapy and his clinical condition has declined, patient's prognosis is guarded and explained that he has days-weeks to live. Patient requests to return to his home country of [facility] [place] to be with his mother and extended family. Refills for pain medications and laxatives prescribed to his pharmacy, letters written for airline. will continue to be available to patient and his family as needed. Patient's 6 year old daughter recently diagnosed with leukemia and is admitted at [facility]\par \par #Metastatic Colon Cancer with Progression of Disease \par - patient's case discussed at GI Tumor Board on 9/14/2022 and radiology images reviewed and compared to his prior ones from [place] that were done in July 2022. Upon radiology review there is increase in size of liver segment from 2X 1.8cm to 3.4X 2.4cm. Additionally a second subcapsular 1cm right lobe lesion conerning for met and another developing one. Mesenteric masses also found including a left upper pelvis nodule 1.8 X1.7cm and a 1.5cm mesenteric nodule.  Given these findings, there are unfortunately no available clinical trials at [facility] that are recruiting.  Additionally searched on clinicaltrials.gov and was not able to find any phase III studies that are accruing patients. Patient requested a referral to [facility] which was previously placed.\par \par In regards to next line of therapy, patient has progressed on FOLFOX, FOLFIRI with no response to Cetuximab despite KRAS WT status (this was confirmed with his primary oncologist). Have sent liquid biopsy for molecular testing but unfortunately no actionable mutations found.  In the meantime discussed subsequent line of treatment with Regorafenib or Lonsurf +/- Bevacizumab per standard of care treatment options via NCCN. Patient elected Regorafenib. Side effects including but not limited to hypertension, fatigue, diarrhea, nausea/vomiting were reviewed and consent signed.  Will begin with Regorafenib 80mg daily X 1 week and if tolerating will increase to full dose 160mg daily. Plan will be for Regorafenib daily X 21 days per every 28 day cycle.\par \par Communicated plan with patient's primary oncologist Dr. Jeronimo and Dr. Daniel in [place]. \par \par Patient then decided to return to [facility] and receive treatment there. Per records he received 3 cycles of Lonsurf, 7 cycles of Nivolumab, had progression of disease in the peritoneum, received RT X 5 for toatl of 2000 cGy 4/13-4/19/2023 and had a liver biopsy sent for [facility] testing. \par \par Patient now presents 5/8/2023 to re-establish care. Have reached out to [facility] regards the liver tissue sent for testing from [facility] and they referred me to [email or web address] to email as they handle international reports. Emailed them on 5/8/23 and waiting for response. If there are no actionable mutations will plan for Regorafenib daily X 21 days per 28 day cycle with one week at 80mg before increasing to full dose 160mg.\par - 5/8/23 CEA rising to 949 (from 36.6 9/8/22) \par - 5/2023 CT C/A/P with progression of disease \par \par #Hyperbilirubinemia, elevated INR, ascites\par - likely liver failure from metastatic disease\par - US with small volume ascites, no role for paracentesis\par - today 6/8/23 labs with worsening bili, LFTs and patient made aware of prognosis and goals of care discussion had\par \par #Symptomatic Anemia\par - Hb 8.8 today, will continue to monitor\par - recently had EGD/colonosocpy 5/26: "Polypoid protrusion in the sigmoid colon at the site of prior anastamosis, likely source of the patient's anemia" path report with colon ca\par \par #Supportive Care\par - for pain: after [place] I-Stop have prescribed Methadone 5mg BID. Will add breakthrough Oxycodone 5mg PRN if needed, at this time patient declines. Additionally reviewed role of palliative care services for both pain and supportive care and patient is interested. Patient has been referred to Dr. Mcmillan and evaluated by her on 5/15/23\par - today 5/30/23 I initiated goals of care with patient and his wife. They understand that cancer directed therapy at this time would be of more harm than benefit. Patient remains hopeful and uses boris to get through this time. Will continue to discuss \par - for constipation: have prescribed Miralax, previously used Lactulose and found himself to be bloated. Still constipated so will add milk of magnesia\par \par Patient to return to [facility]\par  [Palliative Care Plan] : Patient was apprised of incurable nature of disease.  Hospice and Palliative care options discussed. [With Patient/Caregiver] : With Patient/Caregiver [AdvancecareDate] : 6/8/2023

## 2023-06-08 NOTE — ASSESSMENT
[FreeTextEntry1] : 37yoM with:\par \par # Metastatic Colon Cancer - On Regorafenib. Med onc follow up. \par \par # Pain 2/2 Neoplasm - c/w methadone 5mg BID, start hydromorphone 2mg PRN. Rx sent in. \par \par # Encounter for palliative care- Emotional support provided \par Attempted to discuss GOC with patient, including importance of initiating hospice care at this juncture to maintain pain/symptom control and quality of life. Patient however expresses high levels of hope to get better and resume disease-directed treatment. Will continue to attempt discussions. \par \par Follow up in 1 week, call sooner with questions or issues.

## 2023-06-08 NOTE — HISTORY OF PRESENT ILLNESS
[Home] : at home, [unfilled] , at the time of the visit. [Medical Office: ([facility])___] : at the medical office located in  [Verbal consent obtained from patient] : the patient, [unfilled] [FreeTextEntry1] : 37yoM with Metastatic Colon Cancer presents for follow up visit. \par No significant past medical history. \par \[facility] Patient initially diagnosed January 2021 while living in [place]. At that time he had experienced left lower quadrant abdominal pain followed by [facility] He underwent a sigmoidoscopy which revealed a 25cm mass from the anal verge obstructing the lumen, path confirmed moderately differentiated adenocarcinoma of colon. He subsequently underwent LAR surgery, with primary end to end anstamosis and resection of 2 omental nodes, 18LN+. He then had 1st line FOLFOX +Bevacizumab for 8 cycles, complicated by catheter site thrombosis now on anticoagulation with Xarelto, and evidence of progression of disease. Then had 2nd line treatment with FOLFIRI + Cetuximab X 4 cycles and f/u PET scan showed interval morphological increase in size of peritoneal nodules with one having increased FDG uptake. He then underwent peritonectomy and HIPEC with residual peritoneal nodule noted to have mucinous adenoca and so treated with 4 cycles of CAPIRI + Cetuximab. Recent CT scan 7/16/2022 showed stable appearing periotoneal nodules with an interval increase of segment 5 hepatic lesion (15 mm from 7mm). Plan was for local treatment of liver lesion with resection vs RFA, however, patient did not have this done and on the recommendation of his oncologists [place] the [place] 9[place] for a clinical trial.\[facility] GI Tumor Board on 9/14/2022. Upon radiology review there is increase in size of liver segment from 2X 1.8cm to 3.4X 2.4cm. Additionally a second subcapsular 1cm right lobe lesion concerning for met and another developing one. Mesenteric masses also found including a left upper pelvis nodule 1.8 X 1.7cm and a 1.5cm mesenteric nodule. Given these findings, there are unfortunately no available clinical trials at [facility] that are recruiting. \par \[facility] Patient initially presented fall of 2022 for oncology appointment at [facility], then decided to return to his home country of [place] for further treatment. Per records patient brings with him, he received 3 cycles of Lonsurf, 7 cycles of Nivolumab and repeat scans March 2023 with progression of disease. He also had melena with Hb drop from 12 to 7.6 with work up unrevealing. Subsequently had RT X 5 for a total of 2000cGy to peritoneal mets? from 4/13-4/19/2023. \par \par Main reason for which patient is referred is symptom management. \par He experiences RUQ pain which is presently controlled on methadone 5mg BID. He reports having no breakthrough pain. \par \par Interval Hx (6/8/23):\par Patient seen via telemedicine. He has had a decline in his physical condition since our last visit. Has developed abdominal distention, LE edema, scleral icterus, weakness. He is using methadone 5mg BID, not taking any PRN opioid at present. \par \par ROS:\par +[place] - has to stop and catch breath on occasion\par +cough with occasional production of thick sputum for about the last two weeks\par +constipation - uses miralax once daily and this helps keep him regular\par +fatigue - takes naps during the day\par +mood is hopeful, positive. \par Denies n/v, trouble sleeping, \par \par Walks a lot in an attempt to stay physically active. Ambulates without assistive device, has not had any falls. \par \par Patient is , lives with his wife, his two children (ages 6 and 4), his brother and sister in law. He was working in [place] prior to moving, is not presently working. He is not driving, his brother usually helps.\par He is [employer] but does not consider himself very Taoism. Enjoys spending time with his children. \par \par I-STOP Ref#: [id number]

## 2023-06-08 NOTE — HISTORY OF PRESENT ILLNESS
[Disease: _____________________] : Disease: [unfilled] [de-identified] : 37 year old male from [place], presents to [facility] to establish care for metastatic L. sided colon cancer (KRAS WT, BRAF WT, HER2-, SAMAN).\par \par Mr. Barrientos is a 36 y/o M with no PMH who initially presented to hospital in his home country of [place] in January 2021.  At that time he had experienced left lower quadrant abdominal pain followed by bright red blood per rectum. He underwent a sigmoidoscopy which revealed a 25cm mass from the anal verge obstructing the lumen, path confirmed moderately differentiated adenocarcinoma of colon. He subsequently underwent LAR surgery, with primary end to end anstamosis and resection of 2 omental nodes, 18LN+. He then had 1st line FOLFOX +Bevacizumab for 8 cycles, complicated by catheter site thrombosis now on anticoagulation with Xarelto, and evidence of progression of disease. Then had 2nd line treatment with FOLFIRI + Cetuximab X 4 cycles and f/u PET scan showed interval morphological increase in size of peritoneal nodules with one having increased FDG uptake. He then underwent peritonectomy and HIPEC with residual peritoneal nodule noted to have mucinous adenoca and so treated with 4 cycles of CAPIRI + Cetuximab. Recent CT scan 7/16/2022 showed stable appearing periotoneal nodules with an interval increase of segment 5 hepatic lesion (15 mm from 7mm). Plan was for local treatment of liver lesion with resection vs RFA, however, patient did not have this done and on the recommendation of his oncologists came to the [place] for a clinical trial.\par \[facility] GI Tumor Board on 9/14/2022. Upon radiology review there is increase in size of liver segment from 2X 1.8cm to 3.4X 2.4cm. Additionally a second subcapsular 1cm right lobe lesion conerning for met and another developing one. Mesenteric masses also found including a left upper pelvis nodule 1.8 X1.7cm and a 1.5cm mesenteric nodule.  Given these findings, there are unfortunately no available clinical trials at [facility] that are recruiting. \par \par 5/8/23: Patient initially presented fall of 2022 for oncology appointment at [facility], then decided to return to his home country of [place] for further treatment. Per records patient brings with him, he received 3 cycles of Lonsurf, 7 cycles of Nivolumab and repeat scans March 2023 with progression of disease. He also had melena with Hb drop from 12 to 7.6 with work up unrevealing. Subsequently had RT X 5 for a total of 2000cGy to peritoneal mets? from 4/13-4/19/2023. Patient had repeat colonoscopy to send tissue for NGS, however no discrete mass so a liver biopsy was performed 4/26/23 and sent for [facility], reached out to [employer] who gave me email for their international contact ([email or web address]). \par \par 5/12/23: Patient started Regorafenib 80mg X 2 days\par 5/15/23: Patient was evaluated by palliative care Dr. Mcmillan and oncology Dr. Whitley, found to be tachycardic, dyspneic and febrile. Sent to ER and discharged home on PO antibiotics without clear source of infection.\par 5/22/23: Evaluated in clinic for worsening abdominal distension and lower extremity edema R >L. Sent to hospital for labs notable for Hb of 5.5\par 5/22-5/26/23: admitted to hospital. CTA chest without PE, lower ext dopplers without DVT. s/p EGD/colonoscopy 5/26/23: - Polypoid protrusion in the sigmoid colon at the site of prior anastamosis, likely source of the patient's anemia.\par \par PMH/PSH: as above\par Social Hx: has family in [place] (older brother and mother), otherwise wife and 2 young children (son Ravi 3, daughter Jacquie 5) live in [place], works in  for oil company\par Family Hx: no known history\par Allergies: NKDA\par Medications: taking none at this time\par  [de-identified] : CEA on 7/2022= 20.2; CEA on 5/8/23 = 949.0 [Treatment Protocol] : Treatment Protocol [Therapy: ___] : Therapy: [unfilled] [Cycle: ___] : Cycle: [unfilled] [Day: ___] : Day: [unfilled] [de-identified] : Mr. Barrientos presents today with his older brother. He continues to have decline in his clinical condition and discussed goals of care with him again in the presence of his brother. Patient remains hopeful, but understands his prognosis is guarded. He plans to return to his home country of [place] and requests medication refills today. Additionally his 6 year old daughter has recently been diagnosed with leukemia and is at [facility].  [ECOG Performance Status: 0 - Fully active, able to carry on all pre-disease performance without restriction] : Performance Status: 0 - Fully active, able to carry on all pre-disease performance without restriction

## 2023-07-19 ENCOUNTER — APPOINTMENT (OUTPATIENT)
Dept: GASTROENTEROLOGY | Facility: CLINIC | Age: 38
End: 2023-07-19

## 2025-05-05 NOTE — DISCHARGE NOTE NURSING/CASE MANAGEMENT/SOCIAL WORK - NURSING SECTION COMPLETE
Take anti-inflammatory medication as well as muscle relaxer as prescribed to help with discomfort.  I recommend cold compresses for the next couple of days.  Do not use heat in the first 48 hours of discomfort.  Lidocaine patches can also be used to the area of discomfort.  I do recommend follow-up with your doctor especially if pain does not improve over the next few days.  
Patient/Caregiver provided printed discharge information.

## 2025-06-09 NOTE — PROGRESS NOTE ADULT - ASSESSMENT
37y M pmh stage 4 metastatic colon cancer presenting with symptomatic anemia being admitted for further care 
Virtual ICU Quality Rounds    Admit Date: 6/7/2025  Hospital Day: 2    ICU Day: 5h    24H Vital Sign Range:  Temp:  [85.5 °F (29.7 °C)-98.4 °F (36.9 °C)]   Pulse:  []   Resp:  [14-48]   BP: ()/()   SpO2:  [81 %-100 %]   Arterial Line BP: (129-176)/(47-59)     VICU Surveillance Screening  Daily review of  line necessity(optional): Central line(s) in place and Urinary catheter in place  Central line type (optional): Triple lumen catheter  Central line indications : Vasopressors (in past 24/hrs), Amiodarone, Multiple infusions, and Incompatible infusions  Manrique Indications : Critically ill in the intensive care unit requiring hourly urine output monitoring   LDA reconciliation : Yes  Nursing orders placed : IP JANNA Central Line Care and IP JANNA Peripheral IV Access  Manrique best practices : Nurse driven manrique removal protocol ordered               
Impression:  36 yo M w/ PMHx stage 4 colon ca (mets to liver, peritoneum) and LAR of sigmoid primary mass, on multiple lines of chemotherapy; now presenting w/ symtomatic anemia w/o any overt bleeding    # anemia - microcytic anemia, Hg 5.5, which is down from 8.8 one week earlier. States had recent EGD/colonoscopy in [place] (results unavailable) w/o any bleeding. Capsule endoscopy performed inpatient nondiagnostic, as it stayed in stomach x 11 hours. Ddx includes anemia of chronic inflammation, occult blood loss, less likely chemotherapy induced given only one cell line is decreased.     Recommendations:  - f/u heme/onc recs  - ferritin, haptoglobin, retic  - obtain records on recent EGD/colonoscopy; states they were given to Dr. Velarde at [facility]  - discuss with patient role of repeat capsule endoscopy inpatient vs outpatient vs conservative management  - abdominal xray in 1 week to ensure capsule has passed  - trend CBC, transfuse to >7  - rest of care per primary team    GI will continue to follow.     All recommendations are tentative until note is attested by attending.     Gonzalo Richardson, PGY6  Gastroenterology/Hepatology Fellow  During weekdays: Available on [employer] Teams or pager:[phone] ([employer] Short Range Pager); [phone] (Long Range Pager)  Overnight/Weekend: Please page the on-call GI fellow    
37y M pmh stage 4 metastatic colon cancer presenting with symptomatic anemia being admitted for further care 
37y M pmh stage 4 metastatic colon cancer presenting with symptomatic anemia being admitted for further care 
No

## (undated) DEVICE — FOLEY HOLDER STATLOCK 2 WAY ADULT

## (undated) DEVICE — SYR ALLIANCE II INFLATION 60ML

## (undated) DEVICE — TUBING SUCTION CONN 6FT STERILE

## (undated) DEVICE — ELCTR GROUNDING PAD ADULT COVIDIEN

## (undated) DEVICE — SENSOR O2 FINGER ADULT

## (undated) DEVICE — SYR LUER LOK 50CC

## (undated) DEVICE — SOL INJ NS 0.9% 500ML 2 PORT

## (undated) DEVICE — CATH IV SAFE BC 20G X 1.16" (PINK)

## (undated) DEVICE — BRUSH COLONOSCOPY CYTOLOGY

## (undated) DEVICE — BALLOON US ENDO

## (undated) DEVICE — IRRIGATOR BIO SHIELD

## (undated) DEVICE — BITE BLOCK ADULT 20 X 27MM (GREEN)

## (undated) DEVICE — TUBING SUCTION 20FT

## (undated) DEVICE — CLAMP BX HOT RAD JAW 3

## (undated) DEVICE — TUBING IV SET GRAVITY 3Y 100" MACRO

## (undated) DEVICE — FORCEP RADIAL JAW 4 JUMBO 2.8MM 3.2MM 240CM ORANGE DISP

## (undated) DEVICE — POLY TRAP ETRAP

## (undated) DEVICE — BIOPSY FORCEP RADIAL JAW 4 STANDARD WITH NEEDLE

## (undated) DEVICE — CATH IV SAFE BC 22G X 1" (BLUE)

## (undated) DEVICE — SUCTION YANKAUER NO CONTROL VENT

## (undated) DEVICE — PACK IV START WITH CHG